# Patient Record
Sex: MALE | Race: WHITE | NOT HISPANIC OR LATINO | Employment: OTHER | URBAN - METROPOLITAN AREA
[De-identification: names, ages, dates, MRNs, and addresses within clinical notes are randomized per-mention and may not be internally consistent; named-entity substitution may affect disease eponyms.]

---

## 2020-03-23 ENCOUNTER — HOSPITAL ENCOUNTER (INPATIENT)
Facility: MEDICAL CENTER | Age: 85
LOS: 3 days | DRG: 064 | End: 2020-03-26
Attending: EMERGENCY MEDICINE | Admitting: INTERNAL MEDICINE
Payer: MEDICARE

## 2020-03-23 ENCOUNTER — HOSPITAL ENCOUNTER (OUTPATIENT)
Dept: RADIOLOGY | Facility: MEDICAL CENTER | Age: 85
End: 2020-03-23
Payer: MEDICARE

## 2020-03-23 LAB
ANION GAP SERPL CALC-SCNC: 11 MMOL/L (ref 7–16)
APTT PPP: 32.9 SEC (ref 24.7–36)
BASOPHILS # BLD AUTO: 1 % (ref 0–1.8)
BASOPHILS # BLD: 0.07 K/UL (ref 0–0.12)
BUN SERPL-MCNC: 25 MG/DL (ref 8–22)
CALCIUM SERPL-MCNC: 8.8 MG/DL (ref 8.5–10.5)
CHLORIDE SERPL-SCNC: 105 MMOL/L (ref 96–112)
CO2 SERPL-SCNC: 23 MMOL/L (ref 20–33)
CREAT SERPL-MCNC: 0.98 MG/DL (ref 0.5–1.4)
EOSINOPHIL # BLD AUTO: 0.36 K/UL (ref 0–0.51)
EOSINOPHIL NFR BLD: 4.9 % (ref 0–6.9)
ERYTHROCYTE [DISTWIDTH] IN BLOOD BY AUTOMATED COUNT: 42.4 FL (ref 35.9–50)
GLUCOSE SERPL-MCNC: 104 MG/DL (ref 65–99)
HCT VFR BLD AUTO: 47.1 % (ref 42–52)
HGB BLD-MCNC: 15.6 G/DL (ref 14–18)
IMM GRANULOCYTES # BLD AUTO: 0.02 K/UL (ref 0–0.11)
IMM GRANULOCYTES NFR BLD AUTO: 0.3 % (ref 0–0.9)
INR PPP: 1.15 (ref 0.87–1.13)
LYMPHOCYTES # BLD AUTO: 1.96 K/UL (ref 1–4.8)
LYMPHOCYTES NFR BLD: 26.7 % (ref 22–41)
MCH RBC QN AUTO: 30.7 PG (ref 27–33)
MCHC RBC AUTO-ENTMCNC: 33.1 G/DL (ref 33.7–35.3)
MCV RBC AUTO: 92.7 FL (ref 81.4–97.8)
MONOCYTES # BLD AUTO: 0.66 K/UL (ref 0–0.85)
MONOCYTES NFR BLD AUTO: 9 % (ref 0–13.4)
NEUTROPHILS # BLD AUTO: 4.26 K/UL (ref 1.82–7.42)
NEUTROPHILS NFR BLD: 58.1 % (ref 44–72)
NRBC # BLD AUTO: 0 K/UL
NRBC BLD-RTO: 0 /100 WBC
PLATELET # BLD AUTO: 205 K/UL (ref 164–446)
PMV BLD AUTO: 10.4 FL (ref 9–12.9)
POTASSIUM SERPL-SCNC: 4.4 MMOL/L (ref 3.6–5.5)
PROTHROMBIN TIME: 15 SEC (ref 12–14.6)
RBC # BLD AUTO: 5.08 M/UL (ref 4.7–6.1)
SODIUM SERPL-SCNC: 139 MMOL/L (ref 135–145)
TROPONIN T SERPL-MCNC: 13 NG/L (ref 6–19)
WBC # BLD AUTO: 7.3 K/UL (ref 4.8–10.8)

## 2020-03-23 PROCEDURE — 770004 HCHG ROOM/CARE - ONCOLOGY PRIVATE *

## 2020-03-23 PROCEDURE — 700111 HCHG RX REV CODE 636 W/ 250 OVERRIDE (IP): Performed by: EMERGENCY MEDICINE

## 2020-03-23 PROCEDURE — 36415 COLL VENOUS BLD VENIPUNCTURE: CPT

## 2020-03-23 PROCEDURE — 80048 BASIC METABOLIC PNL TOTAL CA: CPT

## 2020-03-23 PROCEDURE — 99285 EMERGENCY DEPT VISIT HI MDM: CPT

## 2020-03-23 PROCEDURE — 85025 COMPLETE CBC W/AUTO DIFF WBC: CPT

## 2020-03-23 PROCEDURE — 96374 THER/PROPH/DIAG INJ IV PUSH: CPT

## 2020-03-23 PROCEDURE — 85610 PROTHROMBIN TIME: CPT

## 2020-03-23 PROCEDURE — 84484 ASSAY OF TROPONIN QUANT: CPT

## 2020-03-23 PROCEDURE — 85730 THROMBOPLASTIN TIME PARTIAL: CPT

## 2020-03-23 RX ORDER — AMOXICILLIN 250 MG
2 CAPSULE ORAL 2 TIMES DAILY PRN
Status: DISCONTINUED | OUTPATIENT
Start: 2020-03-23 | End: 2020-03-26 | Stop reason: HOSPADM

## 2020-03-23 RX ORDER — ENALAPRILAT 1.25 MG/ML
1.25 INJECTION INTRAVENOUS EVERY 6 HOURS PRN
Status: DISCONTINUED | OUTPATIENT
Start: 2020-03-23 | End: 2020-03-26 | Stop reason: HOSPADM

## 2020-03-23 RX ORDER — ONDANSETRON 4 MG/1
4 TABLET, ORALLY DISINTEGRATING ORAL EVERY 4 HOURS PRN
Status: DISCONTINUED | OUTPATIENT
Start: 2020-03-23 | End: 2020-03-26 | Stop reason: HOSPADM

## 2020-03-23 RX ORDER — DEXAMETHASONE SODIUM PHOSPHATE 10 MG/ML
8 INJECTION, SOLUTION INTRAMUSCULAR; INTRAVENOUS ONCE
Status: COMPLETED | OUTPATIENT
Start: 2020-03-23 | End: 2020-03-23

## 2020-03-23 RX ORDER — ONDANSETRON 2 MG/ML
4 INJECTION INTRAMUSCULAR; INTRAVENOUS EVERY 4 HOURS PRN
Status: DISCONTINUED | OUTPATIENT
Start: 2020-03-23 | End: 2020-03-26 | Stop reason: HOSPADM

## 2020-03-23 RX ORDER — ASPIRIN 81 MG/1
81 TABLET, CHEWABLE ORAL DAILY
Status: ON HOLD | COMMUNITY
End: 2020-03-26

## 2020-03-23 RX ORDER — BISACODYL 10 MG
10 SUPPOSITORY, RECTAL RECTAL
Status: DISCONTINUED | OUTPATIENT
Start: 2020-03-23 | End: 2020-03-26 | Stop reason: HOSPADM

## 2020-03-23 RX ORDER — POLYETHYLENE GLYCOL 3350 17 G/17G
1 POWDER, FOR SOLUTION ORAL
Status: DISCONTINUED | OUTPATIENT
Start: 2020-03-23 | End: 2020-03-26 | Stop reason: HOSPADM

## 2020-03-23 RX ORDER — ACETAMINOPHEN 325 MG/1
650 TABLET ORAL EVERY 6 HOURS PRN
Status: DISCONTINUED | OUTPATIENT
Start: 2020-03-23 | End: 2020-03-26 | Stop reason: HOSPADM

## 2020-03-23 RX ORDER — ASPIRIN 81 MG/1
81 TABLET, CHEWABLE ORAL DAILY
Status: DISCONTINUED | OUTPATIENT
Start: 2020-03-24 | End: 2020-03-24

## 2020-03-23 RX ORDER — DEXAMETHASONE SODIUM PHOSPHATE 4 MG/ML
8 INJECTION, SOLUTION INTRA-ARTICULAR; INTRALESIONAL; INTRAMUSCULAR; INTRAVENOUS; SOFT TISSUE EVERY 12 HOURS
Status: DISCONTINUED | OUTPATIENT
Start: 2020-03-24 | End: 2020-03-24

## 2020-03-23 RX ORDER — HEPARIN SODIUM 5000 [USP'U]/ML
5000 INJECTION, SOLUTION INTRAVENOUS; SUBCUTANEOUS EVERY 8 HOURS
Status: DISCONTINUED | OUTPATIENT
Start: 2020-03-24 | End: 2020-03-24

## 2020-03-23 RX ADMIN — DEXAMETHASONE SODIUM PHOSPHATE 8 MG: 10 INJECTION INTRAMUSCULAR; INTRAVENOUS at 23:58

## 2020-03-23 SDOH — HEALTH STABILITY: MENTAL HEALTH: HOW OFTEN DO YOU HAVE A DRINK CONTAINING ALCOHOL?: MONTHLY OR LESS

## 2020-03-24 ENCOUNTER — APPOINTMENT (OUTPATIENT)
Dept: CARDIOLOGY | Facility: MEDICAL CENTER | Age: 85
DRG: 064 | End: 2020-03-24
Attending: STUDENT IN AN ORGANIZED HEALTH CARE EDUCATION/TRAINING PROGRAM
Payer: MEDICARE

## 2020-03-24 ENCOUNTER — APPOINTMENT (OUTPATIENT)
Dept: RADIOLOGY | Facility: MEDICAL CENTER | Age: 85
DRG: 064 | End: 2020-03-24
Attending: STUDENT IN AN ORGANIZED HEALTH CARE EDUCATION/TRAINING PROGRAM
Payer: MEDICARE

## 2020-03-24 PROBLEM — G93.89 BRAIN MASS: Status: ACTIVE | Noted: 2020-03-24

## 2020-03-24 PROBLEM — I25.10 CORONARY ARTERY DISEASE: Status: ACTIVE | Noted: 2020-03-24

## 2020-03-24 PROBLEM — E78.5 HYPERLIPIDEMIA: Status: ACTIVE | Noted: 2020-03-24

## 2020-03-24 PROBLEM — I63.539 ACUTE ISCHEMIC CEREBROVASCULAR ACCIDENT (CVA) INVOLVING POSTERIOR CEREBRAL ARTERY TERRITORY (HCC): Status: ACTIVE | Noted: 2020-03-24

## 2020-03-24 LAB
ABO + RH BLD: NORMAL
ABO GROUP BLD: NORMAL
BLD GP AB SCN SERPL QL: NORMAL
LV EJECT FRACT  99904: 60
LV EJECT FRACT MOD 2C 99903: 61.16
LV EJECT FRACT MOD 4C 99902: 59.76
LV EJECT FRACT MOD BP 99901: 60.49
RH BLD: NORMAL

## 2020-03-24 PROCEDURE — A9576 INJ PROHANCE MULTIPACK: HCPCS | Performed by: STUDENT IN AN ORGANIZED HEALTH CARE EDUCATION/TRAINING PROGRAM

## 2020-03-24 PROCEDURE — 99223 1ST HOSP IP/OBS HIGH 75: CPT | Performed by: PSYCHIATRY & NEUROLOGY

## 2020-03-24 PROCEDURE — 36415 COLL VENOUS BLD VENIPUNCTURE: CPT

## 2020-03-24 PROCEDURE — 700102 HCHG RX REV CODE 250 W/ 637 OVERRIDE(OP): Performed by: INTERNAL MEDICINE

## 2020-03-24 PROCEDURE — 83036 HEMOGLOBIN GLYCOSYLATED A1C: CPT

## 2020-03-24 PROCEDURE — 70553 MRI BRAIN STEM W/O & W/DYE: CPT

## 2020-03-24 PROCEDURE — A9270 NON-COVERED ITEM OR SERVICE: HCPCS | Performed by: INTERNAL MEDICINE

## 2020-03-24 PROCEDURE — 86900 BLOOD TYPING SEROLOGIC ABO: CPT

## 2020-03-24 PROCEDURE — 93880 EXTRACRANIAL BILAT STUDY: CPT

## 2020-03-24 PROCEDURE — 86901 BLOOD TYPING SEROLOGIC RH(D): CPT

## 2020-03-24 PROCEDURE — 700111 HCHG RX REV CODE 636 W/ 250 OVERRIDE (IP): Performed by: STUDENT IN AN ORGANIZED HEALTH CARE EDUCATION/TRAINING PROGRAM

## 2020-03-24 PROCEDURE — 700117 HCHG RX CONTRAST REV CODE 255: Performed by: STUDENT IN AN ORGANIZED HEALTH CARE EDUCATION/TRAINING PROGRAM

## 2020-03-24 PROCEDURE — 93306 TTE W/DOPPLER COMPLETE: CPT

## 2020-03-24 PROCEDURE — 770020 HCHG ROOM/CARE - TELE (206)

## 2020-03-24 PROCEDURE — 93306 TTE W/DOPPLER COMPLETE: CPT | Mod: 26 | Performed by: INTERNAL MEDICINE

## 2020-03-24 PROCEDURE — 99223 1ST HOSP IP/OBS HIGH 75: CPT | Mod: AI,GC | Performed by: INTERNAL MEDICINE

## 2020-03-24 PROCEDURE — 86850 RBC ANTIBODY SCREEN: CPT

## 2020-03-24 RX ORDER — ATORVASTATIN CALCIUM 80 MG/1
80 TABLET, FILM COATED ORAL EVERY EVENING
Status: DISCONTINUED | OUTPATIENT
Start: 2020-03-24 | End: 2020-03-26 | Stop reason: HOSPADM

## 2020-03-24 RX ORDER — SODIUM PHOSPHATE,MONO-DIBASIC 19G-7G/118
500 ENEMA (ML) RECTAL DAILY
COMMUNITY

## 2020-03-24 RX ORDER — ATORVASTATIN CALCIUM 20 MG/1
20 TABLET, FILM COATED ORAL NIGHTLY
Status: ON HOLD | COMMUNITY
End: 2020-03-26

## 2020-03-24 RX ORDER — ASPIRIN 81 MG/1
81 TABLET, CHEWABLE ORAL DAILY
Status: DISCONTINUED | OUTPATIENT
Start: 2020-03-30 | End: 2020-03-26 | Stop reason: HOSPADM

## 2020-03-24 RX ADMIN — ATORVASTATIN CALCIUM 80 MG: 80 TABLET, FILM COATED ORAL at 17:57

## 2020-03-24 RX ADMIN — HEPARIN SODIUM 5000 UNITS: 5000 INJECTION, SOLUTION INTRAVENOUS; SUBCUTANEOUS at 05:44

## 2020-03-24 RX ADMIN — HEPARIN SODIUM 5000 UNITS: 5000 INJECTION, SOLUTION INTRAVENOUS; SUBCUTANEOUS at 00:50

## 2020-03-24 RX ADMIN — GADOTERIDOL 15 ML: 279.3 INJECTION, SOLUTION INTRAVENOUS at 08:17

## 2020-03-24 RX ADMIN — DEXAMETHASONE SODIUM PHOSPHATE 8 MG: 4 INJECTION, SOLUTION INTRA-ARTICULAR; INTRALESIONAL; INTRAMUSCULAR; INTRAVENOUS; SOFT TISSUE at 05:44

## 2020-03-24 ASSESSMENT — ENCOUNTER SYMPTOMS
BLOOD IN STOOL: 0
HEADACHES: 0
DIZZINESS: 0
COUGH: 0
FOCAL WEAKNESS: 0
WEIGHT LOSS: 0
MUSCULOSKELETAL NEGATIVE: 1
BRUISES/BLEEDS EASILY: 0
ABDOMINAL PAIN: 0
DOUBLE VISION: 0
NAUSEA: 0
TREMORS: 0
EYE PAIN: 0
HEMOPTYSIS: 0
BLURRED VISION: 1
HEADACHES: 1
DEPRESSION: 0
EYE REDNESS: 0
NERVOUS/ANXIOUS: 0
NECK PAIN: 0
RESPIRATORY NEGATIVE: 1
FEVER: 0
PALPITATIONS: 0
PHOTOPHOBIA: 0
SPEECH CHANGE: 0
SHORTNESS OF BREATH: 0
CONSTIPATION: 0
CONSTITUTIONAL NEGATIVE: 1
MYALGIAS: 0
SORE THROAT: 0
SENSORY CHANGE: 0
POLYDIPSIA: 0
LOSS OF CONSCIOUSNESS: 0
CARDIOVASCULAR NEGATIVE: 1
GASTROINTESTINAL NEGATIVE: 1
WEAKNESS: 0
VOMITING: 0
DIARRHEA: 0
TINGLING: 0
SEIZURES: 0
EYE DISCHARGE: 0
PSYCHIATRIC NEGATIVE: 1

## 2020-03-24 ASSESSMENT — LIFESTYLE VARIABLES
EVER FELT BAD OR GUILTY ABOUT YOUR DRINKING: NO
EVER_SMOKED: NEVER
ALCOHOL_USE: YES
HAVE PEOPLE ANNOYED YOU BY CRITICIZING YOUR DRINKING: NO
ON A TYPICAL DAY WHEN YOU DRINK ALCOHOL HOW MANY DRINKS DO YOU HAVE: 1
EVER HAD A DRINK FIRST THING IN THE MORNING TO STEADY YOUR NERVES TO GET RID OF A HANGOVER: NO
DOES PATIENT WANT TO STOP DRINKING: NO
HOW MANY TIMES IN THE PAST YEAR HAVE YOU HAD 5 OR MORE DRINKS IN A DAY: 0
CONSUMPTION TOTAL: NEGATIVE
SUBSTANCE_ABUSE: 0
HAVE YOU EVER FELT YOU SHOULD CUT DOWN ON YOUR DRINKING: NO
AVERAGE NUMBER OF DAYS PER WEEK YOU HAVE A DRINK CONTAINING ALCOHOL: 1
TOTAL SCORE: 0

## 2020-03-24 ASSESSMENT — PATIENT HEALTH QUESTIONNAIRE - PHQ9
1. LITTLE INTEREST OR PLEASURE IN DOING THINGS: NOT AT ALL
SUM OF ALL RESPONSES TO PHQ9 QUESTIONS 1 AND 2: 0
2. FEELING DOWN, DEPRESSED, IRRITABLE, OR HOPELESS: NOT AT ALL

## 2020-03-24 NOTE — PROGRESS NOTES
Daily Progress Note:     Date of Service: 3/24/2020  Primary Team: [facility] IM White Team   Attending: Maico Morse M.D.   Senior Resident: Dr. Gage  Intern: Dr. Daigle  Contact:  [phone]    Chief Complaint:   Right  peripheral vision loss    Subjective:  -PMHx of CAD s/p stents x2, HLD, macular degeneration of left eye, is a transfer patient from [facility] on 3/23/2020 with  chief complaint of right-sided peripheral vision loss noted while watching the TV.  Patient unable to see the right side of TV and would turn his head and able to see it.  -CT scan of head done at outside facility which showed left occipital mass, MRI brain with and without contrast was done and [facility] 3/24 revealed ischemic stroke with hemorrhagic transformation of left occipital lobe.  -Patient reported no neurological deficits except colorful lines in his visual deficits.   -During bedside evaluation patient states his right-sided vision deficit improved and he is feeling better.  Reported no numbness weakness, aphasia, ataxia, double vision, headache.  -Neurology was consulted and appreciate the recommendation Dr. Camargo made.   -Late afternoon nurse called and states patient required neuro exam every 4 hour so we decided to transfer the patient to neurology floor.  Patient reported that he is having trouble with his peripheral vision on right side     Consultants/Specialty:  Neurology     Review of Systems:    Review of Systems   Constitutional: Negative.    HENT: Negative.    Eyes: Positive for blurred vision (right peripheral vision). Negative for double vision, photophobia, pain, discharge and redness.   Respiratory: Negative.    Cardiovascular: Negative.    Gastrointestinal: Negative.    Musculoskeletal: Negative.    Skin: Negative.    Neurological: Negative for dizziness, tingling, tremors, sensory change, speech change, focal weakness, seizures, loss of consciousness, weakness and headaches.   Psychiatric/Behavioral:  Negative.        Objective Data:   Physical Exam:   Vitals:   Temp:  [36.4 °C (97.6 °F)-37.2 °C (99 °F)] 36.6 °C (97.9 °F)  Pulse:  [56-87] 87  Resp:  [14-18] 18  BP: (120-198)/(66-99) 120/84  SpO2:  [92 %-96 %] 93 %      Physical Exam  Constitutional:       General: He is not in acute distress.     Appearance: Normal appearance. He is not ill-appearing.   HENT:      Head: Normocephalic and atraumatic.      Mouth/Throat:      Mouth: Mucous membranes are moist.   Eyes:      Extraocular Movements: Extraocular movements intact.      Conjunctiva/sclera: Conjunctivae normal.      Pupils: Pupils are equal, round, and reactive to light.   Neck:      Musculoskeletal: Normal range of motion. No neck rigidity or muscular tenderness.   Cardiovascular:      Rate and Rhythm: Normal rate and regular rhythm.      Pulses: Normal pulses.   Pulmonary:      Effort: Pulmonary effort is normal.      Breath sounds: Normal breath sounds.   Abdominal:      General: Abdomen is flat. Bowel sounds are normal.      Palpations: Abdomen is soft.   Musculoskeletal: Normal range of motion.   Skin:     General: Skin is warm.   Neurological:      General: No focal deficit present.      Mental Status: He is alert and oriented to person, place, and time. Mental status is at baseline.      Cranial Nerves: No cranial nerve deficit.      Sensory: No sensory deficit.      Motor: No weakness.      Coordination: Coordination normal.      Gait: Gait normal.      Deep Tendon Reflexes: Reflexes normal.   Psychiatric:         Mood and Affect: Mood normal.         Behavior: Behavior normal.           Labs:   Lab Results   Component Value Date/Time    SODIUM 139 03/23/2020 10:41 PM    POTASSIUM 4.4 03/23/2020 10:41 PM    CHLORIDE 105 03/23/2020 10:41 PM    CO2 23 03/23/2020 10:41 PM    GLUCOSE 104 (H) 03/23/2020 10:41 PM    BUN 25 (H) 03/23/2020 10:41 PM    CREATININE 0.98 03/23/2020 10:41 PM      Lab Results   Component Value Date/Time    WBC 7.3 03/23/2020  10:41 PM    RBC 5.08 03/23/2020 10:41 PM    HEMOGLOBIN 15.6 03/23/2020 10:41 PM    HEMATOCRIT 47.1 03/23/2020 10:41 PM    MCV 92.7 03/23/2020 10:41 PM    MCH 30.7 03/23/2020 10:41 PM    MCHC 33.1 (L) 03/23/2020 10:41 PM    MPV 10.4 03/23/2020 10:41 PM    NEUTSPOLYS 58.10 03/23/2020 10:41 PM    LYMPHOCYTES 26.70 03/23/2020 10:41 PM    MONOCYTES 9.00 03/23/2020 10:41 PM    EOSINOPHILS 4.90 03/23/2020 10:41 PM    BASOPHILS 1.00 03/23/2020 10:41 PM       Lab Results   Component Value Date/Time    PROTHROMBTM 15.0 (H) 03/23/2020 10:41 PM    INR 1.15 (H) 03/23/2020 10:41 PM       Imaging:   3/23/2020-after reviewing CT head done and outside facility thought to be tumor in the left occipital.   3/24/2020 -MRI brain with and without contrast revealed acute versus subacute ischemic stroke with hemorrhagic transformation in left occipital lobe.    * Acute ischemic cerebrovascular accident (CVA) involving posterior cerebral artery territory (HCC)- (present on admission)  Assessment & Plan  Patient is a 84-year-old male who presented with right vision changes, occipital headache and MRI head revealed ischemic stroke on left occipital lobe.  -Most common cause of occipital stroke is obstruction of the posterior cerebral artery and less likely due to hemorrhage of PCA.  -Major risk factors are diabetes and hypertension. Remote history of skin cancer s/p excised 15 years ago & history of CAD.  -Currently not on any anticoagulation.    Plan:  -Neuro check every 4 hours and stroke assessment with an eye patch as a scale every 12 hour for 3 days and day of discharge.  -Start high intensity statin  -Order CTA head to check the posterior circulation.  -Echo cardio graffiti with bubble study to rule out embolic phenomena  -Keep blood pressure under control goal less than 130/80.  PRN antihypertensive regimen ordered.  -We will initiate smoking cessation and stroke education.  -No antiplatelet therapy for the next 7 days, okay to start  aspirin 81 mg on 3/30   -For now continue SCDs  -Follow lipid panel and A1c.  -Fall/seizure/aspiration precautions  -PT/OT consult placed  -Appreciated neurology recommendations  -We will transfer the patient to neurology floor as he was previously admitted to oncology floor based on of outside CT results.    Coronary artery disease  Assessment & Plan  -History of CAD s/p stent x 2  -No aspirin or other antiplatelet agents for next 7 days.  -Continue to monitor on telemetry  -Blood pressure is well controlled without medications, not on antihypertensive regimen at home either.  -Restart aspirin 81 mg on 3/30    Hyperlipidemia  Assessment & Plan  -On Lipitor at home, unable to recall the exact dose.  -Started on high intensity statin.  -Lipid panel still pending.

## 2020-03-24 NOTE — PROGRESS NOTES
Bedside report given to Neuroscience RN's. Pt transferred via gurney with Neuroscience RN's to with all belongings.

## 2020-03-24 NOTE — PROGRESS NOTES
Received report from night shift RN. POC discussed with patient, pt verbalizes understanding and in agreement. A&Ox4. Q4 Neuro checks.PIV in left and right arm patent, no blood return, SL. SBA. Hard of hearing; wife will be bringing hearing aids and razor for pt. No complaints of pain at this time. Pt up to chair, chair alarm on, call light in reach, hourly rounding in place.

## 2020-03-24 NOTE — SENIOR ADMIT NOTE
84 year old male presents from outside facility for right peripheral vision deficit and normal exam by Ophthalmology, with CT head showing 1.7 cm mass that is suspicious for malignancy/metastatic disease per Radiologist. Never smoker. Had skin cancer of unknown type removed from face without recurrence. Steroids started by ED physician. MRI brain w/wo ordered. Further workup and consults tomorrow per primary team.

## 2020-03-24 NOTE — ASSESSMENT & PLAN NOTE
-History of CAD s/p stent x 2  -No aspirin or other antiplatelet agents for next 7 days.  -Continue to monitor on telemetry  -Blood pressure is well controlled without medications, not on antihypertensive regimen at home either.  -Will restart aspirin 81 mg on 3/30

## 2020-03-24 NOTE — CARE PLAN
Problem: Safety  Goal: Will remain free from injury  Outcome: PROGRESSING AS EXPECTED  Note: Pt up to chair. Chair alarm in place. Pt educated on the importance of utilizing call light to call for assistance.      Problem: Knowledge Deficit  Goal: Knowledge of disease process/condition, treatment plan, diagnostic tests, and medications will improve  Outcome: PROGRESSING AS EXPECTED  Note: MRI screening done by night shift RN. Pt down to MRI, awaiting results and further consults. Will continue to update pt on POC.

## 2020-03-24 NOTE — ED TRIAGE NOTES
Chief Complaint   Patient presents with   • Eye Problem     Pt BIB [facility] as transfer from [facility].  Pt began to have right eye vision changes with half vision turning black then intermittent spots.  Pt went to opthamolgy and found to have no eye problems.  Pt went to er and found to have 1.7 cm mass on left occipital lobe and transferred to this ed for follow up.  Pt states no pain.

## 2020-03-24 NOTE — CONSULTS
"Hospital Neurology Consult:    Referring Physician: Maico Morse M.D.    Reason for consultation: Stroke    HPI: Isaias Singh is a 84 y.o. male with history of macular degeneration, MI, rectal bleeding presenting to the hospital for visual loss and consulted for stroke. Pt was watching TV when he noted he was unable to see the R side of the TV as well. He would turn his head and he would be able to see the TV then. On primary gaze he did not notice any deficit when he was looking at a single object. He went to [facility] and a CT was performed which showed a L occipital hemorrhage concerning for malignancy and he was transferred here for further evaluation. MR Brain W/O CST was performed showing ischemic stroke in this region w/ hemorrhagic transformation.  The patient endorses seeing photopsias and colorful objects in his visual deficit.  He states that he finds them \"quite beautiful\".  These have been in decline for the past 24 hours.  He otherwise denies any numbness, weakness, speech difficulty, vertigo, or double vision.    ROS:     As above. All other systems reviewed and are negative.    Past Medical History:    has a past medical history of Cancer ([facility]), Cataract, Cold, Macular degeneration disease, Myocardial infarct ([facility]), Unspecified urinary incontinence, and Urinary bladder disorder. He also has no past medical history of Anginal syndrome ([facility]), Arrhythmia, Arthritis, Asthma, At risk for sleep apnea, Back pain, Blood clotting disorder ([facility]), Bronchitis, Congestive heart failure ([facility]), COPD (chronic obstructive pulmonary disease) ([facility]), Diabetes ([facility]), Dialysis patient ([facility]), Disorder of thyroid, Fall, Glaucoma, Gynecological disorder, Heart murmur, Heart valve disease, Hemorrhagic disorder ([facility]), Hypertension, Indigestion, Infectious disease, Jaundice, Pacemaker, Pneumonia, Psychiatric problem, Renal disorder, Rheumatic fever, Seizure ([facility]), or Stroke ([facility]).    FHx:  Stroke in his " father    SHrhea:   reports that he has never smoked. He has never used smokeless tobacco. He reports current alcohol use. He reports that he does not use drugs.    Allergies:  Allergies   Allergen Reactions   • Penicillins Rash       Medications:    Current Facility-Administered Medications:   •  atorvastatin (LIPITOR) tablet 80 mg, 80 mg, Oral, Q EVENING, Maico Morse M.D.  •  senna-docusate (PERICOLACE or SENOKOT S) 8.6-50 MG per tablet 2 Tab, 2 Tab, Oral, BID PRN **AND** polyethylene glycol/lytes (MIRALAX) PACKET 1 Packet, 1 Packet, Oral, QDAY PRN **AND** magnesium hydroxide (MILK OF MAGNESIA) suspension 30 mL, 30 mL, Oral, QDAY PRN **AND** bisacodyl (DULCOLAX) suppository 10 mg, 10 mg, Rectal, QDAY PRN, Kelvin Frost M.D.  •  acetaminophen (TYLENOL) tablet 650 mg, 650 mg, Oral, Q6HRS PRN, Kelvin Frost M.D.  •  ondansetron (ZOFRAN) syringe/vial injection 4 mg, 4 mg, Intravenous, Q4HRS PRN, Kelvin Frost M.D.  •  ondansetron (ZOFRAN ODT) dispertab 4 mg, 4 mg, Oral, Q4HRS PRN, Kelvin Frost M.D.  •  enalaprilat (VASOTEC) injection 1.25 mg, 1.25 mg, Intravenous, Q6HRS PRN, Kelvin Frost M.D.    Vitals:   Vitals:    03/23/20 2320 03/24/20 0033 03/24/20 0413 03/24/20 0724   BP: 151/82 149/77 129/76 127/66   Pulse: (!) 58 (!) 56 71 71   Resp: 16 18 18 18   Temp:  36.8 °C (98.3 °F) 37.2 °C (99 °F) 36.4 °C (97.6 °F)   TempSrc:  Temporal Temporal Temporal   SpO2: 94% 96% 92% 92%   Weight:  68.3 kg (150 lb 9.2 oz)     Height:           Labs:  Lab Results   Component Value Date/Time    PROTHROMBTM 15.0 (H) 03/23/2020 10:41 PM    INR 1.15 (H) 03/23/2020 10:41 PM      Lab Results   Component Value Date/Time    WBC 7.3 03/23/2020 10:41 PM    RBC 5.08 03/23/2020 10:41 PM    HEMOGLOBIN 15.6 03/23/2020 10:41 PM    HEMATOCRIT 47.1 03/23/2020 10:41 PM    MCV 92.7 03/23/2020 10:41 PM    MCH 30.7 03/23/2020 10:41 PM    MCHC 33.1 (L) 03/23/2020 10:41 PM    MPV 10.4 03/23/2020 10:41 PM    NEUTSPOLYS 58.10 03/23/2020 10:41 PM     LYMPHOCYTES 26.70 03/23/2020 10:41 PM    MONOCYTES 9.00 03/23/2020 10:41 PM    EOSINOPHILS 4.90 03/23/2020 10:41 PM    BASOPHILS 1.00 03/23/2020 10:41 PM      Lab Results   Component Value Date/Time    SODIUM 139 03/23/2020 10:41 PM    POTASSIUM 4.4 03/23/2020 10:41 PM    CHLORIDE 105 03/23/2020 10:41 PM    CO2 23 03/23/2020 10:41 PM    GLUCOSE 104 (H) 03/23/2020 10:41 PM    BUN 25 (H) 03/23/2020 10:41 PM    CREATININE 0.98 03/23/2020 10:41 PM          Lab Results   Component Value Date/Time    ALKPHOSPHAT 55 04/08/2013 10:48 AM    ASTSGOT 23 04/08/2013 10:48 AM    ALTSGPT 24 04/08/2013 10:48 AM    TBILIRUBIN 0.8 04/08/2013 10:48 AM      Imaging/Testing:  MRI brain without contrast on 3/24/2020 was personally reviewed with presence of an acute/subacute infarct in the left occipital lobe with hemorrhagic transformation.    Carotid Doppler reviewed in chart.    Physical Exam:     General: 84-year-old male sitting in bed no acute distress.  Cardio: Normal S1/S2. No peripheral edema.   Pulm: CTAX2. No respiratory distress.   Skin: Warm, dry, no rashes or lesions   Psychiatric: Appropriate affect. No active psychosis.  HEENT: Atraumatic head, normal sclera and conjunctiva, moist oral mucosa. No lid lag.  Abdomen: Soft, non tender. No masses or hepatosplenomegaly.    Neurologic:  Mental Status:  AAOx4. Able to follow commands/cross midline. Speech fluent/nondysarthric. Language functions intact. No neglect/apraxia.  Cranial Nerves:  PERRL. EOMi. Face symmetric, palate/tongue midline.  Presence of right homonymous hemianopia.  Facial sensation intact.   Motor:  Normal muscle tone and bulk. Strength is 5/5 throughout. No abnormal movements.  Reflexes: Deferred  Coordination: Finger-to-nose without ataxia  Sensation: Normal to light touch throughout  Gait/Station: Deferred    Assessment/Plan:    Isaias Singh is a 84 y.o. male with history of macular degeneration, MI, rectal bleeding presenting to the hospital  for visual loss and consulted for stroke. MRI shows evidence of a L occipital infarct w/ hemorrhagic transformation. Stroke likely 2/2 cardioembolic vs artery-artery embolization. Presence of R hemianopsia on exam. Photopsias and visual phenomenon likely 2/2 release phenomena. This should resolve with time.     Plan:   -Neuro checks/vital signs per protocol.  -Aim for normotension  -LDL and Hgb A1C pending  -Obtain echocardiogram w/ bubble study  -Will likely need CTA Head to evaluate posterior circulation.  -Hold antiplatelets for now. Okay to start ASA 81mg daily in 7 days from admission (3/30/20)  -Initiate smoking cessation/stroke education.  -Schedule evaluation with PT/OT/ST  -Plan discussed with consulting physician and patient's nurse.       Raheem Camargo M.D., Diplomat of the American Board of Psychiatry and Neurology  Diplomat of [employer] Epilepsy Subspecialty   Assistant Clinical Professor, [facility] Neurology Consultant

## 2020-03-24 NOTE — H&P
History & Physical Note    Date of Admission: 3/23/2020  Admission Status: Inpatient  [facility] Team: [facility] IM White Team  Attending: Dr. Morse   Senior Resident: Dr. Gage  Intern: Dr. Daigle  Contact Number: [phone]    Chief Complaint: Eye problem    History of Present Illness (HPI):   Isaias is a 84 y.o. male with the past medical history of coronary artery disease s/p stents x2, BPH, HLD, diverticulitis, macular degeneration of left eye, hearing loss, who was brought in by care flight ground as transfer from [facility] on 3/23/2020 with eye problem.  Patient reported that he is having trouble with his peripheral vision on right side since 2 days.  It started yesterday while he was watching TV, he noticed that he is not having peripheral vision on the right side and his vision is black. No issues with left eye vision. It lasted all day with a brief period of normal vision.  Vision loss is associated with eye pain, which later progressed to headache in the occipital area.  Denies any eye redness, discharge or head injury.  Denies neck stiffness, dizziness, weight loss, seizures, weakness or other focal neurological deficits.  He has balance issues for the past 1 year, but able to ambulate without falling.  He has a remote history of cancer on his face, excised 15 years ago.  He went to the ophthalmologist yesterday, and found to have no eye issues.  He was evaluated in the ED today at [facility] and CT showed 1.7 cm mass on left occipital lobe.  He was transferred to [facility] for further management.  In the ED, he is afebrile, blood pressure 198/99, other vitals stable.  Labs for CBC and CMP are unremarkable.  CT head without contrast from outside hospital showed 1.7 cm mass on left occipital lobe.  He received a dose of IV Decadron 8 mg in the ED.  Patient will be admitted to oncology floor for further work-up of his brain mass.    Review of Systems:  Review of Systems   Constitutional:  Negative for fever and weight loss.   HENT: Positive for hearing loss. Negative for congestion, nosebleeds and sore throat.    Eyes: Positive for blurred vision. Negative for double vision, photophobia, pain, discharge and redness.        Loss of peripheral vision on right side   Respiratory: Negative for cough, hemoptysis and shortness of breath.    Cardiovascular: Negative for chest pain, palpitations and leg swelling.   Gastrointestinal: Negative for abdominal pain, blood in stool, constipation, diarrhea, melena, nausea and vomiting.   Genitourinary: Negative for dysuria, frequency and hematuria.   Musculoskeletal: Negative for joint pain, myalgias and neck pain.   Skin: Negative for itching and rash.   Neurological: Positive for headaches. Negative for tingling, tremors, sensory change, speech change, focal weakness, seizures and loss of consciousness.   Endo/Heme/Allergies: Negative for polydipsia. Does not bruise/bleed easily.   Psychiatric/Behavioral: Negative for depression and substance abuse. The patient is not nervous/anxious.      Past Medical History:   Past Medical History was reviewed with patient.  Macular degeneration of left eye  BPH  Coronary artery disease status post stents x2  Hyperlipidemia  Diverticulitis  Hearing loss    Past Surgical History: Past Surgical History was reviewed with patient.   has a past surgical history that includes other cardiac surgery and trans urethral resection prostate (2013).    Medications: Medications have been reviewed with patient.  Prior to Admission Medications   Prescriptions Last Dose Informant Patient Reported? Taking?   aspirin (ASA) 81 MG Chew Tab chewable tablet   Yes Yes   Sig: Take 81 mg by mouth every day.      Facility-Administered Medications: None     Allergies: Allergies have been reviewed with patient.  Allergies   Allergen Reactions   • Penicillins Rash     Family History: Father  of diverticulitis, brother had a colon resection for  diverticulitis.  Mother had stomach cancer because of radiation.  No other significant history of familial cancers.     Social History:   Tobacco: Never smoked  Alcohol: Rare use  Recreational drugs (illegal and prescription): Never used any illegal drugs or IVDU  Activity Level: Moderate  Living situation:  Lives with his wife in [place], [place]  Recent travel: From [place], [place]  Primary Care Provider: patrice Yoon M.D.  Other (stressors, spirituality, exposures):  NA  Physical Exam:   Vitals:  Temp:  [36.5 °C (97.7 °F)] 36.5 °C (97.7 °F)  Pulse:  [63-72] 63  Resp:  [14] 14  BP: (198)/(99) 198/99  SpO2:  [95 %] 95 %    Physical Exam  Constitutional:       General: He is not in acute distress.     Appearance: Normal appearance.   HENT:      Head: Normocephalic and atraumatic.      Nose: Nose normal.      Mouth/Throat:      Mouth: Mucous membranes are dry.   Eyes:      General: No scleral icterus.        Right eye: No discharge.         Left eye: No discharge.      Extraocular Movements: Extraocular movements intact.      Conjunctiva/sclera: Conjunctivae normal.      Pupils: Pupils are equal, round, and reactive to light.      Comments: Right peripheral vision loss on upper right quadrant+   Neck:      Musculoskeletal: Normal range of motion. No neck rigidity.   Cardiovascular:      Rate and Rhythm: Normal rate and regular rhythm.      Heart sounds: Normal heart sounds.   Pulmonary:      Effort: Pulmonary effort is normal. No respiratory distress.      Breath sounds: Normal breath sounds.   Abdominal:      General: Bowel sounds are normal. There is no distension.      Palpations: Abdomen is soft. There is no mass.      Tenderness: There is no abdominal tenderness.   Musculoskeletal: Normal range of motion.         General: No swelling or tenderness.   Lymphadenopathy:      Cervical: No cervical adenopathy.   Skin:     General: Skin is warm.      Capillary Refill: Capillary refill takes less than 2  seconds.      Findings: No rash.   Neurological:      Mental Status: He is alert and oriented to person, place, and time. Mental status is at baseline.      Cranial Nerves: Cranial nerve deficit present.      Sensory: No sensory deficit.      Motor: No weakness.      Coordination: Coordination normal.      Deep Tendon Reflexes: Reflexes normal.      Comments: Right superior quadrantanopia +   Psychiatric:         Mood and Affect: Mood normal.         Behavior: Behavior normal.         Thought Content: Thought content normal.         Judgment: Judgment normal.     Labs:   CBC: Unremarkable  BMP: BUN 25, creatinine 0.98, glucose 104  Troponin T 13    Imaging:   CT head without contrast:1.7 cm mass on left occipital lobe  MRI brain with and without contrast: Pending    Previous Data Review: reviewed    Assessment and plan:  * Brain mass- (present on admission)  Assessment & Plan  Patient is a 84-year-old male who presented with right vision changes, occipital headache and CT head which showed 1.7 cm mass on left occipital lobe  -No history of smoking, weight loss or any other symptoms  -Remote history of skin cancer s/p excised 15 years ago  -Pending MRI brain  -Received IV Decadron 8 mg in the ED    PLAN:  -Admit to oncology as inpatient  -On neurochecks every 4 hours  -Fall/seizure/aspiration precautions  -IV Decadron 8 mg twice daily  -Consider neurosurgery consult in the a.m.    Coronary artery disease  Assessment & Plan  -History of CAD s/p stent x 2  -Continue aspirin  -Not on any blood pressure medication at home    Hyperlipidemia  Assessment & Plan  -On Lipitor at home, patient unaware of the dose  -Day team to restart home dose

## 2020-03-24 NOTE — ASSESSMENT & PLAN NOTE
-On Lipitor at home, unable to recall the exact dose.  -Started on high intensity statin due to   -Lipid panel still pending.

## 2020-03-24 NOTE — PROGRESS NOTES
2 RN skin check complete with Aster RN  - Bilateral elbows red but blanching  - Lateral wart on L foot  Devices in place NA.  Skin assessed under devices NA.  Confirmed pressure ulcers found on NA.  New potential pressure ulcers noted on NA. Wound consult placed NA.  The following interventions in place:  - Waffle overlay  - Moisturizer  - Pillows in use for positioning

## 2020-03-24 NOTE — PROGRESS NOTES
Arrived to floor via gurney with transport. A&Ox4. Ambulated to restroom, very steady. Bed alarm, waffle, and seizure precautions in place. 2 RN skin check & admit profile complete. Wife to bring hearing aids and home medications sometime tomorrow.Orders in for MRI today. MRI screening tool completed. Q4hr Neuro checks. Denies pain, N/V, numbness or tingling. All needs met. Oriented to room and unit. Education provided on risk for falls and call light use provided. Bed locked & in low position.

## 2020-03-24 NOTE — ED PROVIDER NOTES
"ED Provider Note    Scribed for Castillo Benoit M.D. by Castillo Benoit M.D.. 3/23/2020,  10:52 PM.    CHIEF COMPLAINT  Chief Complaint   Patient presents with   • Eye Problem       [facility]  Isaias Singh is a 84 y.o. male who presents to the Emergency Department as a transfer from [facility].  He was seen there for right eye vision changes, described as a half field cut, then some intermittent \"spots.\"  He first went to ophthalmology, and was found to have no eye problems.  So, he was evaluated in the emergency department, and found to have a 1.7 cm mass on the left occipital lobe.  He was thus transferred here for further evaluation.  He says that the field cut was on the outside of his right eye, described as black shadows.  He had some dull headache last night, but not today.  He says that the visual changes are intermittent, and does not have them now.  He denies any head trauma.  He is not on blood thinners.  He denies fevers or chills, nausea or vomiting, chest pain or shortness of breath or unsteadiness or weakness or numbness or tingling.    I reviewed the patient's labs from prior to transfer.  His chemistry was generally unremarkable, with a slight BUN elevation at 110, and creatinine elevation at 1.1.  BUN to creatinine ratio was 25.5, indicating a prerenal cause of these changes.  He did have a troponin of 0.036, with an upper limit of normal of 0.034.  He has never had chest pain.  I suspect that this elevated troponin is from his intracranial process.  His CBC and differential was entirely unremarkable.  In addition to head CT he had an unremarkable CT of the orbits.         REVIEW OF SYSTEMS  See HPI for further details. All other systems are negative.     PAST MEDICAL HISTORY   has a past medical history of Cold, Macular degeneration disease, Unspecified urinary incontinence, and Urinary bladder disorder.    SOCIAL HISTORY  Social History     Tobacco Use   • Smoking status: " "Never Smoker   • Smokeless tobacco: Never Used   Substance and Sexual Activity   • Alcohol use: Yes     Frequency: Monthly or less   • Drug use: No   • Sexual activity: Not on file     Social History     Substance and Sexual Activity   Drug Use No       SURGICAL HISTORY   has a past surgical history that includes other cardiac surgery and trans urethral resection prostate (4/12/2013).    CURRENT MEDICATIONS  Home Medications     Reviewed by Frederic Parham R.N. (Registered Nurse) on 03/23/20 at 2244  Med List Status: Partial   Medication Last Dose Status   aspirin (ASA) 81 MG Chew Tab chewable tablet  Active                ALLERGIES  Allergies   Allergen Reactions   • Penicillins Rash       PHYSICAL EXAM  VITAL SIGNS: BP (!) 198/99   Pulse 63   Temp 36.5 °C (97.7 °F) (Temporal)   Resp 14   Ht 1.676 m (5' 6\")   Wt 68 kg (150 lb)   SpO2 95%   BMI 24.21 kg/m²   Pulse ox interpretation: I interpret this pulse ox as normal.  Constitutional: Alert in no apparent distress.  HENT: No signs of trauma, Bilateral external ears normal, Nose normal.   Eyes: Pupils are equal and reactive, Conjunctiva normal, Non-icteric.   Neck: Normal range of motion, Supple, No stridor.    Cardiovascular: Regular rate and rhythm.  No murmurs rubs or gallops.  Thorax & Lungs: Clear lungs.  No wheezing.  No increased work of breathing.  Abdomen: Non-distended  Skin:  No erythema, No rash.   Back: Normal alignment and ROM  Extremities: No gross deformity  Musculoskeletal: Good range of motion in all major joints.   Neurologic: Alert, Normal motor function, No focal deficits noted.   Psychiatric: Affect normal, Judgment normal, Mood normal.      DIAGNOSTIC STUDIES / PROCEDURES      LABS  Labs Reviewed - No data to display  All labs reviewed by me.    RADIOLOGY  CT-FOREIGN FILM CAT SCAN   Final Result      OUTSIDE IMAGES-CT HEAD   Final Result        The radiologist's interpretation of all radiological studies have been reviewed by " me.    COURSE & MEDICAL DECISION MAKING  Nursing notes, VS, PMSFHx reviewed in chart.     10:52 PM Patient seen and examined at bedside.  He has a known 1.7 cm left-sided occipital mass.  This will need to be further characterized with MRI, and neurosurgery will need to be consulted.  Patient may benefit from steroid treatment in the meantime.  I will page the resident service for admission.    11:19 PM [facility] agrees to admit.  I've ordered a first dose of decadron IV.     DISPOSITION:  Patient will be admitted to [facility] in stable condition.      FINAL IMPRESSION  1. Left sided occipital brain mass  2. Vision changes  3. Elevated troponin  4. Hypertension

## 2020-03-24 NOTE — ASSESSMENT & PLAN NOTE
Patient is a 84-year-old male who presented with right vision changes, occipital headache and MRI head revealed ischemic stroke on left occipital lobe.  -Most common cause of occipital stroke is obstruction of the posterior cerebral artery and less likely due to hemorrhage of PCA.  -Major risk factors are diabetes and hypertension. Remote history of skin cancer s/p excised 15 years ago & history of CAD.  -Currently not on any anticoagulation.    Plan:  -Neuro check every 4 hours and stroke assessment with an NIHSS every 12 hour for 3 days and day of discharge.  -Cont high intensity statin  -Pending CTA head to check the posterior circulation.  -Echo shows no wall motion abnormalities, with EF 60%  -Keep blood pressure under control goal less than 130/80.  PRN antihypertensive regimen ordered.  -Will initiate smoking cessation and stroke education.  -No antiplatelet therapy for total 1 week, start from 3/23, okay to start aspirin 81 mg on 3/30   -continue SCDs  -lipid panel WNL chol 108, Hdl 49, TG 83, LDL 42 and A1c 5.7  Continuous Fall/seizure/aspiration precautions  -PT/OT consult pending  -Appreciated neurology recommendations  -Continues to monitor on Neurology floor

## 2020-03-25 ENCOUNTER — APPOINTMENT (OUTPATIENT)
Dept: RADIOLOGY | Facility: MEDICAL CENTER | Age: 85
DRG: 064 | End: 2020-03-25
Attending: STUDENT IN AN ORGANIZED HEALTH CARE EDUCATION/TRAINING PROGRAM
Payer: MEDICARE

## 2020-03-25 DIAGNOSIS — I63.9 ACUTE ISCHEMIC STROKE (HCC): Primary | ICD-10-CM

## 2020-03-25 PROBLEM — N17.9 AKI (ACUTE KIDNEY INJURY) (HCC): Status: ACTIVE | Noted: 2020-03-25

## 2020-03-25 LAB
ALBUMIN SERPL BCP-MCNC: 3.8 G/DL (ref 3.2–4.9)
ALBUMIN/GLOB SERPL: 1.5 G/DL
ALP SERPL-CCNC: 67 U/L (ref 30–99)
ALT SERPL-CCNC: 18 U/L (ref 2–50)
ANION GAP SERPL CALC-SCNC: 11 MMOL/L (ref 7–16)
ANION GAP SERPL CALC-SCNC: 11 MMOL/L (ref 7–16)
ANION GAP SERPL CALC-SCNC: 13 MMOL/L (ref 7–16)
AST SERPL-CCNC: 17 U/L (ref 12–45)
BILIRUB SERPL-MCNC: 0.3 MG/DL (ref 0.1–1.5)
BUN SERPL-MCNC: 24 MG/DL (ref 8–22)
BUN SERPL-MCNC: 26 MG/DL (ref 8–22)
BUN SERPL-MCNC: 27 MG/DL (ref 8–22)
CALCIUM SERPL-MCNC: 8.9 MG/DL (ref 8.5–10.5)
CALCIUM SERPL-MCNC: 9 MG/DL (ref 8.5–10.5)
CALCIUM SERPL-MCNC: 9.3 MG/DL (ref 8.5–10.5)
CHLORIDE SERPL-SCNC: 102 MMOL/L (ref 96–112)
CHLORIDE SERPL-SCNC: 106 MMOL/L (ref 96–112)
CHLORIDE SERPL-SCNC: 106 MMOL/L (ref 96–112)
CHOLEST SERPL-MCNC: 108 MG/DL (ref 100–199)
CO2 SERPL-SCNC: 22 MMOL/L (ref 20–33)
CO2 SERPL-SCNC: 25 MMOL/L (ref 20–33)
CO2 SERPL-SCNC: 25 MMOL/L (ref 20–33)
CREAT SERPL-MCNC: 1.2 MG/DL (ref 0.5–1.4)
CREAT SERPL-MCNC: 1.31 MG/DL (ref 0.5–1.4)
CREAT SERPL-MCNC: 1.35 MG/DL (ref 0.5–1.4)
ERYTHROCYTE [DISTWIDTH] IN BLOOD BY AUTOMATED COUNT: 41.1 FL (ref 35.9–50)
EST. AVERAGE GLUCOSE BLD GHB EST-MCNC: 117 MG/DL
GLOBULIN SER CALC-MCNC: 2.6 G/DL (ref 1.9–3.5)
GLUCOSE SERPL-MCNC: 105 MG/DL (ref 65–99)
GLUCOSE SERPL-MCNC: 108 MG/DL (ref 65–99)
GLUCOSE SERPL-MCNC: 110 MG/DL (ref 65–99)
HBA1C MFR BLD: 5.7 % (ref 0–5.6)
HCT VFR BLD AUTO: 46.6 % (ref 42–52)
HDLC SERPL-MCNC: 49 MG/DL
HGB BLD-MCNC: 15.4 G/DL (ref 14–18)
LDLC SERPL CALC-MCNC: 42 MG/DL
MCH RBC QN AUTO: 30.1 PG (ref 27–33)
MCHC RBC AUTO-ENTMCNC: 33 G/DL (ref 33.7–35.3)
MCV RBC AUTO: 91.2 FL (ref 81.4–97.8)
PLATELET # BLD AUTO: 237 K/UL (ref 164–446)
PMV BLD AUTO: 10.4 FL (ref 9–12.9)
POTASSIUM SERPL-SCNC: 3.9 MMOL/L (ref 3.6–5.5)
POTASSIUM SERPL-SCNC: 4.2 MMOL/L (ref 3.6–5.5)
POTASSIUM SERPL-SCNC: 4.2 MMOL/L (ref 3.6–5.5)
PROT SERPL-MCNC: 6.4 G/DL (ref 6–8.2)
RBC # BLD AUTO: 5.11 M/UL (ref 4.7–6.1)
SODIUM SERPL-SCNC: 139 MMOL/L (ref 135–145)
SODIUM SERPL-SCNC: 140 MMOL/L (ref 135–145)
SODIUM SERPL-SCNC: 142 MMOL/L (ref 135–145)
TRIGL SERPL-MCNC: 83 MG/DL (ref 0–149)
WBC # BLD AUTO: 14.4 K/UL (ref 4.8–10.8)

## 2020-03-25 PROCEDURE — 36415 COLL VENOUS BLD VENIPUNCTURE: CPT

## 2020-03-25 PROCEDURE — 700102 HCHG RX REV CODE 250 W/ 637 OVERRIDE(OP): Performed by: INTERNAL MEDICINE

## 2020-03-25 PROCEDURE — 80061 LIPID PANEL: CPT

## 2020-03-25 PROCEDURE — 700105 HCHG RX REV CODE 258: Performed by: STUDENT IN AN ORGANIZED HEALTH CARE EDUCATION/TRAINING PROGRAM

## 2020-03-25 PROCEDURE — 99233 SBSQ HOSP IP/OBS HIGH 50: CPT | Mod: GC | Performed by: INTERNAL MEDICINE

## 2020-03-25 PROCEDURE — 700117 HCHG RX CONTRAST REV CODE 255: Performed by: STUDENT IN AN ORGANIZED HEALTH CARE EDUCATION/TRAINING PROGRAM

## 2020-03-25 PROCEDURE — 80053 COMPREHEN METABOLIC PANEL: CPT

## 2020-03-25 PROCEDURE — 97161 PT EVAL LOW COMPLEX 20 MIN: CPT

## 2020-03-25 PROCEDURE — A9270 NON-COVERED ITEM OR SERVICE: HCPCS | Performed by: INTERNAL MEDICINE

## 2020-03-25 PROCEDURE — 80048 BASIC METABOLIC PNL TOTAL CA: CPT

## 2020-03-25 PROCEDURE — 99232 SBSQ HOSP IP/OBS MODERATE 35: CPT | Performed by: PSYCHIATRY & NEUROLOGY

## 2020-03-25 PROCEDURE — 85027 COMPLETE CBC AUTOMATED: CPT

## 2020-03-25 PROCEDURE — 97165 OT EVAL LOW COMPLEX 30 MIN: CPT

## 2020-03-25 PROCEDURE — 770020 HCHG ROOM/CARE - TELE (206)

## 2020-03-25 PROCEDURE — 70496 CT ANGIOGRAPHY HEAD: CPT

## 2020-03-25 RX ORDER — SODIUM CHLORIDE 9 MG/ML
500 INJECTION, SOLUTION INTRAVENOUS ONCE
Status: COMPLETED | OUTPATIENT
Start: 2020-03-25 | End: 2020-03-25

## 2020-03-25 RX ORDER — SODIUM CHLORIDE, SODIUM LACTATE, POTASSIUM CHLORIDE, AND CALCIUM CHLORIDE .6; .31; .03; .02 G/100ML; G/100ML; G/100ML; G/100ML
500 INJECTION, SOLUTION INTRAVENOUS ONCE
Status: COMPLETED | OUTPATIENT
Start: 2020-03-25 | End: 2020-03-25

## 2020-03-25 RX ADMIN — ATORVASTATIN CALCIUM 80 MG: 80 TABLET, FILM COATED ORAL at 17:36

## 2020-03-25 RX ADMIN — SODIUM CHLORIDE 500 ML: 9 INJECTION, SOLUTION INTRAVENOUS at 10:06

## 2020-03-25 RX ADMIN — IOHEXOL 80 ML: 350 INJECTION, SOLUTION INTRAVENOUS at 09:51

## 2020-03-25 RX ADMIN — SODIUM CHLORIDE, POTASSIUM CHLORIDE, SODIUM LACTATE AND CALCIUM CHLORIDE 500 ML: 600; 310; 30; 20 INJECTION, SOLUTION INTRAVENOUS at 17:56

## 2020-03-25 ASSESSMENT — COGNITIVE AND FUNCTIONAL STATUS - GENERAL
MOBILITY SCORE: 24
DAILY ACTIVITIY SCORE: 24
SUGGESTED CMS G CODE MODIFIER DAILY ACTIVITY: CH
SUGGESTED CMS G CODE MODIFIER MOBILITY: CH

## 2020-03-25 ASSESSMENT — ENCOUNTER SYMPTOMS
EYE PAIN: 0
SENSORY CHANGE: 0
DIZZINESS: 0
EYE REDNESS: 0
EYE DISCHARGE: 0
WEAKNESS: 0
SPEECH CHANGE: 0
LOSS OF CONSCIOUSNESS: 0
HEADACHES: 0
MUSCULOSKELETAL NEGATIVE: 1
DEPRESSION: 0
PHOTOPHOBIA: 0
CONSTITUTIONAL NEGATIVE: 1
DOUBLE VISION: 0
RESPIRATORY NEGATIVE: 1
BLURRED VISION: 0
GASTROINTESTINAL NEGATIVE: 1
FEVER: 0
SEIZURES: 0
FOCAL WEAKNESS: 0
CARDIOVASCULAR NEGATIVE: 1
CHILLS: 0

## 2020-03-25 ASSESSMENT — ACTIVITIES OF DAILY LIVING (ADL): TOILETING: INDEPENDENT

## 2020-03-25 ASSESSMENT — GAIT ASSESSMENTS
GAIT LEVEL OF ASSIST: SUPERVISED
DISTANCE (FEET): 200

## 2020-03-25 NOTE — ASSESSMENT & PLAN NOTE
-Mild elevation in Cr 0.98 upon admission to 1.20 on 3/25, and GFR decrease to 58.  -patient have insufficient oral intake.   -CT head with contrast done and given 500 mL bolus of normal saline.  -Encouraged the patient to have good oral intake of fluids  -recheck the basic metabolic panel at 1500.Give another bolus of IV fluids if Cr still elevated

## 2020-03-25 NOTE — DISCHARGE PLANNING
Current documentation reveals a potential for acute inpatient rehabilitation, pending completed W/U & TX evals.  Please consider a PMR referral to assist with discharge planning.

## 2020-03-25 NOTE — PROGRESS NOTES
Daily Progress Note:     Date of Service: 3/25/2020  Primary Team: [facility] IM White Team   Attending: Maico Morse M.D.   Senior Resident: Dr. Gage  Intern: Dr. Daigle  Contact:  [phone]    Chief Complaint:   Right  peripheral vision loss    Subjective:   PMHx of CAD s/p stents x2, HLD, macular degeneration of left eye, is a transfer patient from [facility] on 3/23/2020 with  chief complaint of right-sided peripheral vision loss noted while watching the TV.  Patient unable to see the right side of TV and would turn his head and able to see it. MRI brain with and without contrast was done and [facility] 3/24 revealed ischemic stroke with hemorrhagic transformation of left occipital lobe. Patient reported no neurological deficits except colorful lines in his visual deficits. Reported no numbness weakness, aphasia, ataxia, double vision, headache.     3/25  -No acute events overnight, pt is hemodynamically stable.   -No additional neurological and visual deficits reported since admission.   -Pt is doing better and clinically improving.   -Mild elevation in Cr 0.98 upon admission to 1.20 on 3/25, patient have insufficient oral intake.   -CTA head done w/ contrast, given 500 ml NS bolus.   -Spoke to patient's wife Kathy and updated her regarding the current diagnose, treatment and plan.     Consultants/Specialty:  Neurology    Review of Systems:    Review of Systems   Constitutional: Negative.  Negative for chills and fever.   HENT: Negative.    Eyes: Negative for blurred vision, double vision, photophobia, pain, discharge and redness.   Respiratory: Negative.    Cardiovascular: Negative.    Gastrointestinal: Negative.    Musculoskeletal: Negative.    Skin: Negative.    Neurological: Negative for dizziness, sensory change, speech change, focal weakness, seizures, loss of consciousness, weakness and headaches.   Psychiatric/Behavioral: Negative for depression and suicidal ideas.       Objective Data:   Physical  Exam:   Vitals:   Temp:  [36.1 °C (97 °F)-37.3 °C (99.1 °F)] 36.4 °C (97.5 °F)  Pulse:  [64-87] 64  Resp:  [16-18] 16  BP: (120-152)/(64-84) 141/72  SpO2:  [92 %-94 %] 93 %   CREATE MACRO BE SURE THAT THIS IS PERTINENT TO YOUR PATIENT!  Physical Exam  Constitutional:       General: He is not in acute distress.     Appearance: Normal appearance. He is normal weight. He is not ill-appearing.   HENT:      Head: Normocephalic and atraumatic.      Mouth/Throat:      Mouth: Mucous membranes are moist.      Pharynx: Oropharynx is clear. No oropharyngeal exudate or posterior oropharyngeal erythema.   Eyes:      General:         Right eye: No discharge.         Left eye: No discharge.      Extraocular Movements: Extraocular movements intact.      Conjunctiva/sclera: Conjunctivae normal.      Pupils: Pupils are equal, round, and reactive to light.   Neck:      Musculoskeletal: Normal range of motion. No neck rigidity or muscular tenderness.      Vascular: No carotid bruit.   Cardiovascular:      Rate and Rhythm: Normal rate and regular rhythm.      Pulses: Normal pulses.      Heart sounds: Normal heart sounds.   Pulmonary:      Effort: Pulmonary effort is normal. No respiratory distress.      Breath sounds: Normal breath sounds. No wheezing or rales.   Abdominal:      General: Abdomen is flat.   Musculoskeletal: Normal range of motion.   Lymphadenopathy:      Cervical: No cervical adenopathy.   Neurological:      General: No focal deficit present.      Mental Status: He is alert and oriented to person, place, and time. Mental status is at baseline.      Cranial Nerves: No cranial nerve deficit.      Sensory: No sensory deficit.      Motor: No weakness.      Coordination: Coordination normal.      Gait: Gait normal.      Deep Tendon Reflexes: Reflexes normal.   Psychiatric:         Mood and Affect: Mood normal.           Labs:   Lab Results   Component Value Date/Time    SODIUM 139 03/25/2020 03:55 AM    POTASSIUM 4.2  03/25/2020 03:55 AM    CHLORIDE 106 03/25/2020 03:55 AM    CO2 22 03/25/2020 03:55 AM    GLUCOSE 108 (H) 03/25/2020 03:55 AM    BUN 26 (H) 03/25/2020 03:55 AM    CREATININE 1.20 03/25/2020 03:55 AM      Lab Results   Component Value Date/Time    WBC 14.4 (H) 03/25/2020 03:55 AM    RBC 5.11 03/25/2020 03:55 AM    HEMOGLOBIN 15.4 03/25/2020 03:55 AM    HEMATOCRIT 46.6 03/25/2020 03:55 AM    MCV 91.2 03/25/2020 03:55 AM    MCH 30.1 03/25/2020 03:55 AM    MCHC 33.0 (L) 03/25/2020 03:55 AM    MPV 10.4 03/25/2020 03:55 AM    NEUTSPOLYS 58.10 03/23/2020 10:41 PM    LYMPHOCYTES 26.70 03/23/2020 10:41 PM    MONOCYTES 9.00 03/23/2020 10:41 PM    EOSINOPHILS 4.90 03/23/2020 10:41 PM    BASOPHILS 1.00 03/23/2020 10:41 PM      Imaging:   3/24/2020 -MRI brain with and without contrast revealed acute versus subacute ischemic stroke with hemorrhagic transformation in left occipital lobe.       * Acute ischemic cerebrovascular accident (CVA) involving posterior cerebral artery territory (HCC)- (present on admission)  Assessment & Plan  Patient is a 84-year-old male who presented with right vision changes, occipital headache and MRI head revealed ischemic stroke on left occipital lobe.  -Most common cause of occipital stroke is obstruction of the posterior cerebral artery and less likely due to hemorrhage of PCA.  -Major risk factors are diabetes and hypertension. Remote history of skin cancer s/p excised 15 years ago & history of CAD.  -Currently not on any anticoagulation.    Plan:  -Neuro check every 4 hours and stroke assessment with an NIHSS every 12 hour for 3 days and day of discharge.  -Cont high intensity statin  -Pending CTA head to check the posterior circulation.  -Echo shows no wall motion abnormalities, with EF 60%  -Keep blood pressure under control goal less than 130/80.  PRN antihypertensive regimen ordered.  -Will initiate smoking cessation and stroke education.  -No antiplatelet therapy for total 1 week, start from  3/23, okay to start aspirin 81 mg on 3/30   -continue SCDs  -lipid panel WNL chol 108, Hdl 49, TG 83, LDL 42 and A1c 5.7  Continuous Fall/seizure/aspiration precautions  -PT/OT consult pending  -Appreciated neurology recommendations  -Continues to monitor on Neurology floor    Coronary artery disease  Assessment & Plan  -History of CAD s/p stent x 2  -No aspirin or other antiplatelet agents for next 7 days.  -Continue to monitor on telemetry  -Blood pressure is well controlled without medications, not on antihypertensive regimen at home either.  -Will restart aspirin 81 mg on 3/30    Acute kidney injury likely due to dehydration   Assessment & Plan  -Mild elevation in Cr 0.98 upon admission to 1.20 on 3/25, and GFR decrease to 58.  -patient have insufficient oral intake.   -CT head with contrast done and given 500 mL bolus of normal saline.  -Encouraged the patient to have good oral intake of fluids  -recheck the basic metabolic panel at 1500.Give another bolus of IV fluids if Cr still elevated     Hyperlipidemia  Assessment & Plan  -On Lipitor at home, unable to recall the exact dose.  -Started on high intensity statin due to   -Lipid panel still pending.

## 2020-03-25 NOTE — PROGRESS NOTES
Pt arrived to unit, states he has dull headache, does not want anything at this time. [facility] performed with charge RN.

## 2020-03-25 NOTE — THERAPY
"Physical Therapy Evaluation completed.     Bed Mobility:  Supine to Sit: Supervised  Transfers: Sit to Stand: Supervised  Gait: Level Of Assist: Supervised with No Equipment Needed       Plan of Care: Patient with no further skilled PT needs in the acute care setting at this time  Discharge Recommendations: Equipment: No Equipment Needed. Post-acute therapy Discharge to home with outpatient or home health for additional skilled therapy services.    Patient is 84 year old male who presents with R peripheral vision deficit due to potential mass over left occipital lobe (posterior cerebral artery).  PMHx includes coronary artery disease s/p stents x2, BPH, HLD, diverticulitis, macular degeneration of left eye, hearing loss, cancer.  Patient demonstrates mild deficits in balance and strength, however these deficits will not impede patient's safety in returning home.  Recommend discharge home without an additional physical therapy services. Patient will not be actively followed for physical therapy services at this time, however may be seen if requested by physician for 1 more visit within 30 days to address any discharge or equipment needs.    Parker Hankins PT    See \"Rehab Therapy-Acute\" Patient Summary Report for complete documentation.     "

## 2020-03-25 NOTE — CARE PLAN
Problem: Knowledge Deficit  Goal: Knowledge of disease process/condition, treatment plan, diagnostic tests, and medications will improve  Outcome: PROGRESSING AS EXPECTED  Intervention: Explain information regarding disease process/condition, treatment plan, diagnostic tests, and medications and document in education  Note: Stroke education packet given and went over in detail with pt. Pt specific risk factors discusses, s/s, and what pt can to do prevent another stroke discussed.      Problem: Mobility:  Goal: Capacity to carry out activities will improve  Outcome: PROGRESSING AS EXPECTED  Intervention: Implement activity per PT/OT recommendations  Note: PT/OT ordered. Pt up in hallway with PT. Awaiting OT eval.

## 2020-03-25 NOTE — PROGRESS NOTES
[facility] Neurology Progress Note:     Interval History: No acute events overnight.  Complaints today.    Objective:   Vitals:    03/24/20 1700 03/24/20 2000 03/25/20 0000 03/25/20 0400   BP: 152/84 131/77 125/64 141/72   Pulse: 75 67 76 64   Resp: 18 18 18 16   Temp: 36.7 °C (98.1 °F) 36.1 °C (97 °F) 37.3 °C (99.1 °F) 36.4 °C (97.5 °F)   TempSrc: Temporal Temporal Temporal Temporal   SpO2: 92% 93% 94% 93%   Weight:       Height:           Labs:     Lab Results   Component Value Date/Time    PROTHROMBTM 15.0 (H) 03/23/2020 10:41 PM    INR 1.15 (H) 03/23/2020 10:41 PM      Lab Results   Component Value Date/Time    WBC 14.4 (H) 03/25/2020 03:55 AM    RBC 5.11 03/25/2020 03:55 AM    HEMOGLOBIN 15.4 03/25/2020 03:55 AM    HEMATOCRIT 46.6 03/25/2020 03:55 AM    MCV 91.2 03/25/2020 03:55 AM    MCH 30.1 03/25/2020 03:55 AM    MCHC 33.0 (L) 03/25/2020 03:55 AM    MPV 10.4 03/25/2020 03:55 AM    NEUTSPOLYS 58.10 03/23/2020 10:41 PM    LYMPHOCYTES 26.70 03/23/2020 10:41 PM    MONOCYTES 9.00 03/23/2020 10:41 PM    EOSINOPHILS 4.90 03/23/2020 10:41 PM    BASOPHILS 1.00 03/23/2020 10:41 PM      Lab Results   Component Value Date/Time    SODIUM 139 03/25/2020 03:55 AM    POTASSIUM 4.2 03/25/2020 03:55 AM    CHLORIDE 106 03/25/2020 03:55 AM    CO2 22 03/25/2020 03:55 AM    GLUCOSE 108 (H) 03/25/2020 03:55 AM    BUN 26 (H) 03/25/2020 03:55 AM    CREATININE 1.20 03/25/2020 03:55 AM      Lab Results   Component Value Date/Time    CHOLSTRLTOT 108 03/25/2020 03:55 AM    LDL 42 03/25/2020 03:55 AM    HDL 49 03/25/2020 03:55 AM    TRIGLYCERIDE 83 03/25/2020 03:55 AM       Lab Results   Component Value Date/Time    ALKPHOSPHAT 67 03/25/2020 03:55 AM    ASTSGOT 17 03/25/2020 03:55 AM    ALTSGPT 18 03/25/2020 03:55 AM    TBILIRUBIN 0.3 03/25/2020 03:55 AM        Imaging/Testing:   No new neuro imaging to review    TTE reviewed in chart.    Physical Exam:      General: 84-year-old male sitting in bed no acute distress.  Cardio: Normal S1/S2.  No peripheral edema.   Pulm: CTAX2. No respiratory distress.   Skin: Warm, dry, no rashes or lesions   Psychiatric: Appropriate affect. No active psychosis.  HEENT: Atraumatic head, normal sclera and conjunctiva, moist oral mucosa. No lid lag.  Abdomen: Soft, non tender. No masses or hepatosplenomegaly.     Neurologic:  Mental Status:  AAOx4. Able to follow commands/cross midline. Speech fluent/nondysarthric. Language functions intact. No neglect/apraxia.  Cranial Nerves:  PERRL. EOMi. Face symmetric, palate/tongue midline.  Presence of right homonymous hemianopia.  Facial sensation intact.   Motor:  Normal muscle tone and bulk. Strength is 5/5 throughout. No abnormal movements.  Reflexes: Deferred  Coordination: Finger-to-nose without ataxia  Sensation: Normal to light touch throughout  Gait/Station: Deferred    Patient seen and examined on 3/25/2020 and compared to 3/24/2020 no significant clinical change was seen.    Assessment/Plan:     Isaias Singh is a 84 y.o. male with history of macular degeneration, MI, rectal bleeding presenting to the hospital for visual loss and consulted for stroke. MRI shows evidence of a L occipital infarct w/ hemorrhagic transformation. Stroke likely 2/2 cardioembolic vs artery-artery embolization. Presence of R hemianopsia on exam. Photopsias and visual phenomenon likely 2/2 release phenomena.  Today however hallucinations appear to be more formed.  Nevertheless, they are nonthreatening and should resolve with time.  Of note, he should not drive while this hemianopsia is present.  I would recommend for the patient to have visual field testing in approximately 3 months from now (end of July) to determine if he still has a visual field deficit before resuming driving.    Plan:  1.  Neurochecks and vital signs per protocol  2.  Aim for normotension  3.  LDL 42.  At goal.  4.  Hemoglobin A1c 5.7.  At goal  5.  Patient will need cardiac rhythm monitoring as an outpatient.  6.   CTA head to be done today.  7.  Hold antiplatelets for now.  Okay to start aspirin 81 mg daily on 3/30/2020. Should the patient require anticoagulation antiplatelets should be discontinued.  8.  PT OT and speech therapy as needed  9.  Referral for [facility] placed.  10.  Neurology signing off for now.  Please call with any further questions or concerns.  11.  Plan discussed with consulting physician and patient's nurse.     Raheem Camargo M.D., Diplomat of the American Board of Psychiatry and Neurology  Diplomat of [employer] Epilepsy Subspecialty   Assistant Clinical Professor, [facility] Neurology Consultant

## 2020-03-25 NOTE — THERAPY
"Occupational Therapy Evaluation completed.   Functional Status:  Mod I ADLs and ADL transfers without AD  Plan of Care: Patient with no further skilled OT needs in the acute care setting at this time  Discharge Recommendations:  Equipment: No Equipment Needed. Post-acute therapy Currently anticipate no further skilled therapy needs once patient is discharged from the inpatient setting.    See \"Rehab Therapy-Acute\" Patient Summary Report for complete documentation.    Pt is 85yo male admitted with visual changes, MRI found subacute ischemic stroke in L occipital lobe. Pt presents to OT eval without functional deficits that indicate need for OT intervention. Pt demonstrated toileting, full body dressing, tub transfer and standing grooming without AD. Pt reports supportive spouse who can provide transportation as he plans not to drive with his new onset of R field blurry vision. No additional acute OT needs at this time.   "

## 2020-03-26 VITALS
OXYGEN SATURATION: 93 % | HEART RATE: 57 BPM | DIASTOLIC BLOOD PRESSURE: 76 MMHG | RESPIRATION RATE: 16 BRPM | WEIGHT: 150.57 LBS | TEMPERATURE: 98.5 F | SYSTOLIC BLOOD PRESSURE: 135 MMHG | BODY MASS INDEX: 24.2 KG/M2 | HEIGHT: 66 IN

## 2020-03-26 LAB
ALBUMIN SERPL BCP-MCNC: 3.9 G/DL (ref 3.2–4.9)
ALBUMIN/GLOB SERPL: 1.6 G/DL
ALP SERPL-CCNC: 58 U/L (ref 30–99)
ALT SERPL-CCNC: 23 U/L (ref 2–50)
ANION GAP SERPL CALC-SCNC: 11 MMOL/L (ref 7–16)
AST SERPL-CCNC: 19 U/L (ref 12–45)
BASOPHILS # BLD AUTO: 1 % (ref 0–1.8)
BASOPHILS # BLD: 0.08 K/UL (ref 0–0.12)
BILIRUB SERPL-MCNC: 0.4 MG/DL (ref 0.1–1.5)
BUN SERPL-MCNC: 23 MG/DL (ref 8–22)
CALCIUM SERPL-MCNC: 8.9 MG/DL (ref 8.5–10.5)
CHLORIDE SERPL-SCNC: 104 MMOL/L (ref 96–112)
CO2 SERPL-SCNC: 23 MMOL/L (ref 20–33)
CREAT SERPL-MCNC: 1.04 MG/DL (ref 0.5–1.4)
EOSINOPHIL # BLD AUTO: 0.18 K/UL (ref 0–0.51)
EOSINOPHIL NFR BLD: 2.2 % (ref 0–6.9)
ERYTHROCYTE [DISTWIDTH] IN BLOOD BY AUTOMATED COUNT: 43.3 FL (ref 35.9–50)
GLOBULIN SER CALC-MCNC: 2.4 G/DL (ref 1.9–3.5)
GLUCOSE SERPL-MCNC: 95 MG/DL (ref 65–99)
HCT VFR BLD AUTO: 47.6 % (ref 42–52)
HGB BLD-MCNC: 15.3 G/DL (ref 14–18)
IMM GRANULOCYTES # BLD AUTO: 0.02 K/UL (ref 0–0.11)
IMM GRANULOCYTES NFR BLD AUTO: 0.2 % (ref 0–0.9)
LYMPHOCYTES # BLD AUTO: 2.27 K/UL (ref 1–4.8)
LYMPHOCYTES NFR BLD: 27.7 % (ref 22–41)
MCH RBC QN AUTO: 30.1 PG (ref 27–33)
MCHC RBC AUTO-ENTMCNC: 32.1 G/DL (ref 33.7–35.3)
MCV RBC AUTO: 93.5 FL (ref 81.4–97.8)
MONOCYTES # BLD AUTO: 0.44 K/UL (ref 0–0.85)
MONOCYTES NFR BLD AUTO: 5.4 % (ref 0–13.4)
NEUTROPHILS # BLD AUTO: 5.2 K/UL (ref 1.82–7.42)
NEUTROPHILS NFR BLD: 63.5 % (ref 44–72)
NRBC # BLD AUTO: 0 K/UL
NRBC BLD-RTO: 0 /100 WBC
PLATELET # BLD AUTO: 224 K/UL (ref 164–446)
PMV BLD AUTO: 10.5 FL (ref 9–12.9)
POTASSIUM SERPL-SCNC: 4.3 MMOL/L (ref 3.6–5.5)
PROT SERPL-MCNC: 6.3 G/DL (ref 6–8.2)
RBC # BLD AUTO: 5.09 M/UL (ref 4.7–6.1)
SODIUM SERPL-SCNC: 138 MMOL/L (ref 135–145)
WBC # BLD AUTO: 8.2 K/UL (ref 4.8–10.8)

## 2020-03-26 PROCEDURE — 36415 COLL VENOUS BLD VENIPUNCTURE: CPT

## 2020-03-26 PROCEDURE — 99239 HOSP IP/OBS DSCHRG MGMT >30: CPT | Mod: GC | Performed by: INTERNAL MEDICINE

## 2020-03-26 PROCEDURE — 80053 COMPREHEN METABOLIC PANEL: CPT

## 2020-03-26 PROCEDURE — 92523 SPEECH SOUND LANG COMPREHEN: CPT

## 2020-03-26 PROCEDURE — 85025 COMPLETE CBC W/AUTO DIFF WBC: CPT

## 2020-03-26 RX ORDER — ATORVASTATIN CALCIUM 80 MG/1
80 TABLET, FILM COATED ORAL EVERY EVENING
Qty: 30 TAB | Refills: 0 | Status: SHIPPED | OUTPATIENT
Start: 2020-03-26

## 2020-03-26 RX ORDER — ATORVASTATIN CALCIUM 80 MG/1
80 TABLET, FILM COATED ORAL EVERY EVENING
Qty: 30 TAB | Refills: 2 | Status: SHIPPED | OUTPATIENT
Start: 2020-03-26 | End: 2020-03-26

## 2020-03-26 RX ORDER — ASPIRIN 81 MG/1
81 TABLET, CHEWABLE ORAL DAILY
Qty: 100 TAB | Refills: 0 | Status: SHIPPED | OUTPATIENT
Start: 2020-03-30

## 2020-03-26 ASSESSMENT — ENCOUNTER SYMPTOMS
DIZZINESS: 0
COUGH: 0
SORE THROAT: 0
FEVER: 0
CONSTIPATION: 0
NERVOUS/ANXIOUS: 0
CHILLS: 0
SHORTNESS OF BREATH: 0

## 2020-03-26 NOTE — PROGRESS NOTES
Monitor summary: SB/SR 50-63, MD 0.18, QRS 0.10, QT 0.42, with rare PVCs per strip from monitor room.

## 2020-03-26 NOTE — THERAPY
"Speech Language Therapy Evaluation completed to address speech, communication and cognition  Functional Status:  Pt seen for cognitive-linguistic evaluation and presents with functional skills across cognitive linguistic domains with exception of complaints of reduced vision in R eye; visual scanning is completed L to R with L eye only, with large and medium print. 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 Mild emotional lability noted as well.  No additional SLP needs at this time although pt is aware of SLP scope of services and exercises to improve aging brain.  Recommendations:  Ok for home discharge; defer to OT  Plan of Care: Patient with no further skilled SLP needs in the acute care setting at this time  Post-Acute Therapy: suspect HH services for OT are indicated    See \"Rehab Therapy-Acute\" Patient Summary Report for complete documentation.   "

## 2020-03-26 NOTE — CARE PLAN
Problem: Communication  Goal: The ability to communicate needs accurately and effectively will improve  Outcome: PROGRESSING AS EXPECTED  Note: Encouraged to voice feelings, discussed POC, encouraged to voice questions     Problem: Safety  Goal: Will remain free from falls  Outcome: PROGRESSING AS EXPECTED  Note: Bed in lowest position, call light within reach, clutter free environment, encouraged to call prior to ambulation

## 2020-03-26 NOTE — PROGRESS NOTES
Monitor summary: SB/SR 53-81, LA 0.18, QRS 0.08, QT 0.38 with rare PAC/PVC per strip from monitor room.

## 2020-03-26 NOTE — PROGRESS NOTES
Pt discharged home with wife, escort provided. Discussed discharge instructions, all belongings taken, home meds and Rx taken.     Reinforced inability to drive until follow up with PCP. Pt verbalizes understanding.      Pt discussed being more emotional since being in the hospital and was concerned about how long it would take to go away. Discussed voicing his feelings with his support system and if symptoms do not go away or get worse then to reach out to his PCP, either at or before his follow up appointment.

## 2020-03-26 NOTE — CARE PLAN
Problem: Communication  Goal: The ability to communicate needs accurately and effectively will improve  Outcome: PROGRESSING AS EXPECTED     Problem: Safety  Goal: Will remain free from injury  Outcome: PROGRESSING AS EXPECTED  Note: Appropriate precautions in place     Problem: Knowledge Deficit  Goal: Knowledge of disease process/condition, treatment plan, diagnostic tests, and medications will improve  Outcome: PROGRESSING AS EXPECTED  Note: Stroke education/participation performed     Problem: Safety:  Goal: Will remain free from injury  Outcome: PROGRESSING AS EXPECTED  Note: Appropriate precautions in place     Problem: Knowledge Deficit:  Goal: Knowledge of disease process/condition, treatment plan, diagnostic tests, and medications will improve  Outcome: PROGRESSING AS EXPECTED  Note: Stroke education/participation performed

## 2020-03-26 NOTE — DISCHARGE INSTRUCTIONS
Discharge Instructions    Discharged to home by car with relative. Discharged via wheelchair, hospital escort: Yes.  Special equipment needed: Not Applicable    Be sure to schedule a follow-up appointment with your primary care doctor or any specialists as instructed.     Discharge Plan:   Diet Plan: Discussed  Activity Level: Discussed  Confirmed Follow up Appointment: Patient to Call and Schedule Appointment  Confirmed Symptoms Management: Discussed  Influenza Vaccine Indication: Not indicated: Previously immunized this influenza season and > 8 years of age    I understand that a diet low in cholesterol, fat, and sodium is recommended for good health. Unless I have been given specific instructions below for another diet, I accept this instruction as my diet prescription.   Other diet: Regular    Special Instructions:     Stroke/CVA/TIA/Hemorrhagic Ischemia Discharge Instructions  You have had a stroke. Your risk factors have been identified as follows:  Age - Over 55  Gender - Men are at a higher risk than women  Artery Disease / Peripheral Vascular Disease   High Cholesterol and lipids  It is important that you reduce your risk factors to avoid another stroke in the future. Here are some general guidelines to follow:  · Eat healthy - avoid food high in fat.  · Get regular exercise.  · Maintain a healthy weight.  · Avoid smoking.  · Avoid alcohol and illegal drug use.  · Take your medications as directed.  For more information regarding risk factors, refer to pages 17-19 in your Stroke Patient Education Guide. Stroke Education Guide was given to patient.    Warning signs of a stroke include (which can also be found on page 3 of your Stroke Patient Education Guide):  · Sudden numbness of weakness of the face, arm or leg (especially on one side of the body).  · Sudden confusion, trouble speaking or understanding.  · Sudden trouble seeing in one or both eyes.  · Sudden trouble walking, dizziness, loss of balance or  coordination.  · Sudden severe headache with no known cause.  It is very important to get treatment quickly when a stroke occurs. If you experience any of the above warning signs, call [phone] immediately.     Some patients who have had a stroke will be going home on a blood thinner medication called Warfarin (Coumadin).  This medication requires very close monitoring and follow up.  This follow up can be provided by either your Primary Care Physician or by [facility] Outpatient Anticoagulation Service.  The Outpatient Anticoagulation Service is located at the [facility] at [facility] ([facility]).  If you do not know when your follow up appointment is scheduled, call [phone] to verify your appointment time.      · Is patient discharged on Warfarin / Coumadin?   No     Depression / Suicide Risk    As you are discharged from this [facility], it is important to learn how to keep safe from harming yourself.    Recognize the warning signs:  · Abrupt changes in personality, positive or negative- including increase in energy   · Giving away possessions  · Change in eating patterns- significant weight changes-  positive or negative  · Change in sleeping patterns- unable to sleep or sleeping all the time   · Unwillingness or inability to communicate  · Depression  · Unusual sadness, discouragement and loneliness  · Talk of wanting to die  · Neglect of personal appearance   · Rebelliousness- reckless behavior  · Withdrawal from people/activities they love  · Confusion- inability to concentrate     If you or a loved one observes any of these behaviors or has concerns about self-harm, here's what you can do:  · Talk about it- your feelings and reasons for harming yourself  · Remove any means that you might use to hurt yourself (examples: pills, rope, extension cords, firearm)  · Get professional help from the community (Mental Health, Substance Abuse,  psychological counseling)  · Do not be alone:Call your Safe Contact- someone whom you trust who will be there for you.  · Call your local CRISIS HOTLINE 899-0879 or 229-661-0985  · Call your local Children's Mobile Crisis Response Team [place] (841) 131-5188 or www.Ease My Sell  · Call the toll free National Suicide Prevention Hotlines   · National Suicide Prevention Lifeline 211-782-JZKO (8489)  · [place] [facility] Network 800-SUICIDE (668-7912)        Ischemic Stroke  An ischemic stroke is the sudden death of brain tissue. Blood carries oxygen to all areas of the body. This type of stroke happens when your blood does not flow to your brain like normal. Your brain cannot get the oxygen it needs. This is an emergency. It must be treated right away.  Symptoms of a stroke usually happen all of a sudden. You may notice them when you wake up. They can include:  · Weakness or loss of feeling in your face, arm, or leg. This often happens on one side of the body.  · Trouble walking.  · Trouble moving your arms or legs.  · Loss of balance or coordination.  · Feeling confused.  · Trouble talking or understanding what people are saying.  · Slurred speech.  · Trouble seeing.  · Seeing two of one object (double vision).  · Feeling dizzy.  · Feeling sick to your stomach (nauseous) and throwing up (vomiting).  · A very bad headache for no reason.  Get help as soon as any of these problems start. This is important. Some treatments work better if they are given right away. These include:  · Aspirin.  · Medicines to control blood pressure.  · A shot (injection) of medicine to break up the blood clot.  · Treatments given in the blood vessel (artery) to take out the clot or break it up.  Other treatments may include:  · Oxygen.  · Fluids given through an IV tube.  · Medicines to thin out your blood.  · Procedures to help your blood flow better.  What increases the risk?  Certain things may make you more likely to have a  stroke. Some of these are things that you can change, such as:  · Being very overweight (obesity).  · Smoking.  · Taking birth control pills.  · Not being active.  · Drinking too much alcohol.  · Using drugs.  Other risk factors include:  · High blood pressure.  · High cholesterol.  · Diabetes.  · Heart disease.  · Being , , , or .  · Being over age 60.  · Family history of stroke.  · Having had blood clots, stroke, or warning stroke (transient ischemic attack, TIA) in the past.  · Sickle cell disease.  · Being a woman with a history of high blood pressure in pregnancy (preeclampsia).  · Migraine headache.  · Sleep apnea.  · Having an irregular heartbeat (atrial fibrillation).  · Long-term (chronic) diseases that cause soreness and swelling (inflammation).  · Disorders that affect how your blood clots.  Follow these instructions at home:  Medicines  · Take over-the-counter and prescription medicines only as told by your doctor.  · If you were told to take aspirin or another medicine to thin your blood, take it exactly as told by your doctor.  ¨ Taking too much of the medicine can cause bleeding.  ¨ If you do not take enough, it may not work as well.  · Know the side effects of your medicines. If you are taking a blood thinner, make sure you:  ¨ Hold pressure over any cuts for longer than usual.  ¨ Tell your dentist and other doctors that you take this medicine.  ¨ Avoid activities that may cause damage or injury to your body.  Eating and drinking  · Follow instructions from your doctor about what you cannot eat or drink.  · Eat healthy foods.  · If you have trouble with swallowing, do these things to avoid choking:  ¨ Take small bites when eating.  ¨ Eat foods that are soft or pureed.  Safety  · Follow instructions from your health care team about physical activity.  · Use a walker or cane as told by your doctor.  · Keep your home safe so you do not fall. This may  include:  ¨ Having experts look at your home to make sure it is safe.  ¨ Putting grab bars in the bedroom and bathroom.  ¨ Using raised toilets.  ¨ Putting a seat in the shower.  General instructions  · Do not use any tobacco products.  ¨ Examples of these are cigarettes, chewing tobacco, and e-cigarettes.  ¨ If you need help quitting, ask your doctor.  · Limit how much alcohol you drink. This means no more than 1 drink a day for nonpregnant women and 2 drinks a day for men. One drink equals 12 oz of beer, 5 oz of wine, or 1½ oz of hard liquor.  · If you need help to stop using drugs or alcohol, ask your doctor to refer you to a program or specialist.  · Stay active. Exercise as told by your doctor.  · Keep all follow-up visits as told by your doctor. This is important.  Get help right away if:  · You suddenly:  ¨ Have weakness or loss of feeling in your face, arm, or leg.  ¨ Feel confused.  ¨ Have trouble talking or understanding what people are saying.  ¨ Have trouble seeing.  ¨ Have trouble walking.  ¨ Have trouble moving your arms or legs.  ¨ Feel dizzy.  ¨ Lose your balance or coordination.  ¨ Have a very bad headache and you do not know why.  · You pass out (lose consciousness) or almost pass out.  · You have jerky movements that you cannot control (seizure).  These symptoms may be an emergency. Do not wait to see if the symptoms will go away. Get medical help right away. Call your local emergency services (911 in the [place] Do not drive yourself to the hospital.   This information is not intended to replace advice given to you by your health care provider. Make sure you discuss any questions you have with your health care provider.  Document Released: 12/06/2012 Document Revised: 05/30/2017 Document Reviewed: 03/15/2017  Elsevier Interactive Patient Education © 2017 Elsevier Inc.          Age-Related Macular Degeneration  Introduction  Age-related macular degeneration (AMD) is an eye disease related to aging.  The disease causes you to lose your central vision, which is the part of your vision that allows you to see objects clearly and do daily tasks like reading and driving.  There are two main types of AMD:  · Dry AMD. People with this type lose their vision slowly. This is the most common type of AMD. Some people with dry AMD notice very little change in their vision as they age.  · Wet AMD. People with this type lose their vision quickly.  What are the causes?  This condition is caused by damage to the part of the eye that provides you with central vision (macula). Dry AMD happens when deposits in the macula cause light-sensitive cells to slowly break down. Wet AMD happens when abnormal blood vessels grow under the macula and leak blood and fluid.  What increases the risk?  The following factors may make you more likely to develop this condition:  · Being 50 years old or older.  · Smoking.  · Being obese.  · Having a family history of AMD.  · Having high cholesterol, high blood pressure, or heart disease.  · Having been exposed to high levels of ultraviolet (UV) light and blue light.  · Being white ().  · Being female.  What are the signs or symptoms?  Symptoms of this condition include:  · Blurred vision, especially when reading print material. The blurred vision often goes away in brighter light.  · A blurred or blind spot in the center of your field of vision that is small but growing larger.  · Bright colors seeming less bright than they used to be.  · Decreased ability to recognize faces.  · One eye seeming worse than the other.  · Decreased ability to adapt to dimly lit rooms.  · Straight lines appearing crooked or wavy.  How is this diagnosed?  This condition is diagnosed based on your symptoms and an eye exam. During the eye exam, eye drops will be placed in your eyes to enlarge (dilate) your pupils. This will allow your health care provider to see the back of your eye better. You may be asked to look  at an image that looks like a checkerboard (Amsler grid). Early changes in your central vision may cause the grid to look distorted.  After the exam, you may be given a test called a fluorescein angiogram. This test determines whether you have dry or wet AMD. During this test, dye is injected into a vein and pictures are taken as the dye travels through your eye.  How is this treated?  There is no cure for this condition, but treatment can help slow down how fast it gets worse. Treatment may include:  · Supplements, including vitamin C, vitamin E, beta carotene, and zinc.  · Laser surgery to destroy new blood vessels or leaking blood vessels in your eye.  · Injections of medicines into your eye to slow down the formation of abnormal blood vessels that might leak. These injections are given by a health care provider.  Follow these instructions at home:  · Get an eye exam as often told by your health care provider. Make sure to get an eye exam at least once every year.  · Take vitamins and supplements as told by your health care provider.  · Take over-the-counter and prescription medicines only as told by your health care provider.  · Ask your health care provider for an Amsler grid, and use it every day to check each eye for vision changes.  · Keep all follow-up visits as told by your health care provider. This is important.  Contact a health care provider if:  · You notice any new changes in your vision.  This information is not intended to replace advice given to you by your health care provider. Make sure you discuss any questions you have with your health care provider.  Document Released: 03/26/2009 Document Revised: 05/25/2017 Document Reviewed: 10/13/2016  © 2017 Elsevier

## 2020-03-26 NOTE — DISCHARGE SUMMARY
Internal Medicine Discharge Summary  Note Author: Lesly Gage M.D.       Name Isaias Singh     1935   Age/Sex 84 y.o. male   MRN [id number]       Admit Date:  3/23/2020       Discharge Date:   3/25/2020    Service:   R Internal Medicine White Team  Attending Physician(s):          Senior Resident(s):   Too   Rowdy Resident(s):   [place]   PCP: Angel Yoon M.D.    Primary Diagnosis:   Acute ischemic cerebrovascular accident (CVA) involving posterior cerebral artery territory (HCC)    Secondary Diagnoses:                Principal Problem:    Acute ischemic cerebrovascular accident (CVA) involving posterior cerebral artery territory (HCC) POA: Yes  Active Problems:    Coronary artery disease POA: Unknown    Hyperlipidemia POA: Unknown    Acute kidney injury likely due to dehydration  POA: No  Resolved Problems:    * No resolved hospital problems. *    Hospital Summary (Brief Narrative):         Isaias is a 84 y.o. male with the past medical history of coronary artery disease s/p stents x2, BPH, HLD, diverticulitis, macular degeneration of left eye, hearing loss, who was brought in by care flight ground as transfer from [facility] on 3/23/2020 with symptoms of trouble vision on right side for 2 days.  CT head without contrast from outside hospital showed 1.7 cm mass on left occipital lobe. CTA head during this admission   Showed Subacute left occipital infarct with small associated petechial hemorrhage. Mild Atrophy and chronic small vessel ischemic changes.  Neurology was consulted and recommended MRI brain showed Acute / subacute hemorrhagic left occipital lobe infarct. Mild chronic microvascular ischemic type changes and multiple chronic small lacunar infarcts. No acute intervention was recommended. Discussed with patient regarding the Home health as recommended by OT, patient would like to defer this at this time. PT and SLP recommended no acute needs post  discharge. He will start aspirin on 3/30/2020. He will not drive for three months as recommended by neurology and advised to follow up to re assess. Document has been faxed to the [facility].   He will follow up with PCP outpatient.     Consultants:     Neurology    Procedures:        None    Imaging/ Testing:      CT-CTA HEAD WITH & W/O-POST PROCESS   Final Result      1.  No large vessel occlusion, high-grade stenosis or aneurysm of the [place].   2.  Subacute left occipital infarct with small associated petechial hemorrhage. No significant interval change since recent prior study.   3.  Mild atrophy and chronic small vessel ischemic changes.         EC-ECHOCARDIOGRAM COMPLETE W/O CONT   Final Result      US-CAROTID DOPPLER BILAT   Final Result      MR-BRAIN-WITH & W/O   Final Result      1.  Acute/subacute hemorrhagic left occipital lobe infarct.   2.  Mild chronic microvascular ischemic type changes and multiple chronic small lacunar infarcts.   3.  Age-related generalized volume loss.   4.  No significant mass or pathologic enhancement.      CT-FOREIGN FILM CAT SCAN   Final Result      OUTSIDE IMAGES-CT HEAD   Final Result        Discharge Medications:         Medication Reconciliation: Completed       Medication List      CHANGE how you take these medications      Instructions   aspirin 81 MG Chew chewable tablet  Start taking on:  March 30, 2020  What changed:  These instructions start on March 30, 2020. If you are unsure what to do until then, ask your doctor or other care provider.  Commonly known as:  ASA   Take 1 Tab by mouth every day.  Dose:  81 mg     atorvastatin 80 MG tablet  What changed:    · medication strength  · how much to take  · when to take this  Commonly known as:  LIPITOR   Take 1 Tab by mouth every evening.  Dose:  80 mg        CONTINUE taking these medications      Instructions   glucosamine Sulfate 500 MG Caps   Take 500 mg by mouth every day.  Dose:  500 mg            Can use  .DISCHARGEMEDSLIST if going to another facility         Disposition:   Home    Diet:   As tolerated     Activity:   As tolerated    Instructions:         The patient was instructed to return to the ER in the event of worsening symptoms. I have counseled the patient on the importance of compliance and the patient has agreed to proceed with all medical recommendations and follow up plan indicated above.   The patient understands that all medications come with benefits and risks. Risks may include permanent injury or death and these risks can be minimized with close reassessment and monitoring.        Primary Care Provider:    Angel Yoon M.D.  Discharge summary faxed to primary care provider:  Completed  Copy of discharge summary given to the patient: Completed      Follow up appointment details :        Angel Yoon M.D.  [address] [place] [address]  [phone]    Schedule an appointment as soon as possible for a visit in 1 week  for hospital follow up discharge     Pending Studies:        None     Time spent on discharge day patient visit, preparing discharge paperwork and arranging for patient follow up.    Discharge Time (Minutes) :    55 minutes   Hospital Course Type:  Inpatient Stay >2 midnights    Condition on Discharge    ______________________________________________________________________    Interval history/exam for day of discharge:    - No acute complaints this am  - VSS stable overnight  - Labs reviewed: unremarkable    Review of Systems   Constitutional: Negative for chills and fever.   HENT: Negative for congestion and sore throat.    Respiratory: Negative for cough and shortness of breath.    Cardiovascular: Negative for chest pain.   Gastrointestinal: Negative for constipation.   Endocrine: Negative for cold intolerance.   Genitourinary: Negative for frequency and urgency.   Neurological: Negative for dizziness.   Psychiatric/Behavioral: The patient is not nervous/anxious.      Physical  Exam   Constitutional: He is oriented to person, place, and time. No distress.   HENT:   Mouth/Throat: Oropharynx is clear and moist. No oropharyngeal exudate.   Eyes: Pupils are equal, round, and reactive to light. Conjunctivae and EOM are normal.   Neck: Normal range of motion. Neck supple. No JVD present.   Cardiovascular: Normal rate and regular rhythm.   Pulmonary/Chest: Effort normal and breath sounds normal.   Abdominal: Soft. Bowel sounds are normal. He exhibits no distension. There is no abdominal tenderness.   Musculoskeletal: Normal range of motion.         General: No edema.   Neurological: He is alert and oriented to person, place, and time. No cranial nerve deficit.   Skin: Skin is warm and dry. He is not diaphoretic.   Psychiatric: Affect normal.   Nursing note and vitals reviewed.    Most Recent Labs:    Lab Results   Component Value Date/Time    WBC 8.2 03/26/2020 03:36 AM    RBC 5.09 03/26/2020 03:36 AM    HEMOGLOBIN 15.3 03/26/2020 03:36 AM    HEMATOCRIT 47.6 03/26/2020 03:36 AM    MCV 93.5 03/26/2020 03:36 AM    MCH 30.1 03/26/2020 03:36 AM    MCHC 32.1 (L) 03/26/2020 03:36 AM    MPV 10.5 03/26/2020 03:36 AM    NEUTSPOLYS 63.50 03/26/2020 03:36 AM    LYMPHOCYTES 27.70 03/26/2020 03:36 AM    MONOCYTES 5.40 03/26/2020 03:36 AM    EOSINOPHILS 2.20 03/26/2020 03:36 AM    BASOPHILS 1.00 03/26/2020 03:36 AM      Lab Results   Component Value Date/Time    SODIUM 138 03/26/2020 03:36 AM    POTASSIUM 4.3 03/26/2020 03:36 AM    CHLORIDE 104 03/26/2020 03:36 AM    CO2 23 03/26/2020 03:36 AM    GLUCOSE 95 03/26/2020 03:36 AM    BUN 23 (H) 03/26/2020 03:36 AM    CREATININE 1.04 03/26/2020 03:36 AM      Lab Results   Component Value Date/Time    ALTSGPT 23 03/26/2020 03:36 AM    ASTSGOT 19 03/26/2020 03:36 AM    ALKPHOSPHAT 58 03/26/2020 03:36 AM    TBILIRUBIN 0.4 03/26/2020 03:36 AM    ALBUMIN 3.9 03/26/2020 03:36 AM    GLOBULIN 2.4 03/26/2020 03:36 AM    INR 1.15 (H) 03/23/2020 10:41 PM     Lab Results      Component Value Date/Time    PROTHROMBTM 15.0 (H) 03/23/2020 10:41 PM    INR 1.15 (H) 03/23/2020 10:41 PM

## 2020-05-12 NOTE — PROGRESS NOTES
Chief Complaint   Patient presents with   • New Patient     stroke       Problem List Items Addressed This Visit     None      Visit Diagnoses     Ischemic stroke (HCC)          THIS CONSULTATION WAS PERFORMED VIA TELEMEDICINE, UTILIZING AN ENCRYPTED TRANSMISSION, TO PREVENT SPREAD OF COVID19. THE PATIENT CONSENTED TO THIS CONSULTATION.    History of present illness:  Isaias Singh 84 y.o. male presents today for stroke bridge clinic. His wife, Paulette, is joining us today. PMHx: CAD, BPH, HLD, macular degeneration in L eye    Pt was seen in the hospital on 3/24/2020 for c/o right vision loss and headache. Brain scan showed left occipital infarct with hemorrhagic transformation. He was already taking baby asa prior to this and was on low dose statin, as well. When he got discharge from the hospital his statin was increased to 80mg daily. He continues to c/o of cloudiness and visual disturbances in the right. He is seeing ophthalmology and wife says he also has cataracts bilaterally. He is also getting injections for his macular degeneration in the left eye but his eye doctor is concerned of the bleeding side effects of the drug called avastin. They want to know my opinion, if he should continue getting the injection given the side effect. They state that his cardiologist had recommended to stop taking asa 2 weeks prior to his injection. Pt reports the he can go blind without the injection.    He does drink alcohol. Not smoking cigarettes.not using recreational drugs.     He has a long term cardiologist and wife had mentioned heart monitoring to him as pt was supposed to leave the hospital with a monitor but that did not happen. His cardiology agreed to this and they state that they will do this on his next f/u.    He is checking his BP all the time and BP has been <120/80. He is not taking BP med.     Father had a stroke.     He is not driving.     Past medical history:   Past Medical History:   Diagnosis  Date   • Acute ischemic cerebrovascular accident (CVA) involving posterior cerebral artery territory (HCC)    • Acute kidney injury likely due to dehydration  3/25/2020   • Cancer (HCC)     melanoma of L cheek    • Cataract    • Cold    • Macular degeneration disease    • Myocardial infarct (HCC)     2012   • Unspecified urinary incontinence    • Urinary bladder disorder        Past surgical history:   Past Surgical History:   Procedure Laterality Date   • TRANS URETHRAL RESECTION PROSTATE  4/12/2013    Performed by Omar [facility] M.D. at [facility]   • OTHER CARDIAC SURGERY      stents in heart       Family history:   History reviewed. No pertinent family history.    Social history:   Social History     Socioeconomic History   • Marital status:      Spouse name: Not on file   • Number of children: Not on file   • Years of education: Not on file   • Highest education level: Not on file   Occupational History   • Not on file   Social Needs   • Financial resource strain: Not on file   • Food insecurity     Worry: Not on file     Inability: Not on file   • Transportation needs     Medical: Not on file     Non-medical: Not on file   Tobacco Use   • Smoking status: Never Smoker   • Smokeless tobacco: Never Used   Substance and Sexual Activity   • Alcohol use: Yes     Frequency: Monthly or less   • Drug use: No   • Sexual activity: Not on file   Lifestyle   • Physical activity     Days per week: Not on file     Minutes per session: Not on file   • Stress: Not on file   Relationships   • Social connections     Talks on phone: Not on file     Gets together: Not on file     Attends Hindu service: Not on file     Active member of club or organization: Not on file     Attends meetings of clubs or organizations: Not on file     Relationship status: Not on file   • Intimate partner violence     Fear of current or ex partner: Not on file     Emotionally abused: Not on file     Physically abused: Not on  file     Forced sexual activity: Not on file   Other Topics Concern   • Primary/coprimary nurse & associates Not Asked   • Family contact information Not Asked   • OK to release patient information to the following Not Asked   • Patient preferred routine/Privacy concerns Not Asked   • Patient likes and dislikes Not Asked   • Participating In Research Study Not Asked   • Miscellaneous Not Asked   Social History Narrative   • Not on file       Current medications:   Current Outpatient Medications   Medication   • atorvastatin (LIPITOR) 80 MG tablet   • aspirin (ASA) 81 MG Chew Tab chewable tablet   • glucosamine Sulfate 500 MG Cap     No current facility-administered medications for this visit.        Medication Allergy:  Allergies   Allergen Reactions   • Penicillins Rash       eview of systems:     General: Denies fevers or chills, or nightsweats, or generalized fatigue.    Head: Denies headaches or dizziness or lightheadedness  EENT: Denies nasal secretion, nasal bleeding, difficulty swallowing, hearing loss, tinnitus, vertigo, ear pain. + vision changes bilaterally  Respiratory: Denies shortness of breath, cough, sputum, or wheezing  Cardiac: Denies chest pain, palpitations, edema or syncope  Gastrointestinal: Denies nausea, vomiting, no abdominal pain or change in bowel habits, no melena or hematochezia  Urinary: Denies dysuria, frequency, hesitancy, or incontinence.  Dermatologic:  Denies new rash  Musculoskeletal: Denies muscle pain or swelling, no atrophy, no neck and back pain or stiffness.   Neurologic: Denies facial droopiness, muscle weakness (focal or generalized), paresthesias, ataxia, change in speech or language, memory loss, abnormal movements, seizures, loss of consciousness, or episodes of confusion.   Psychiatric: Denies anxiety, depression, mood swings, suicidal or homicidal thoughts       Physical examination:   General: Patient in no acute distress, pleasant and  cooperative.  HEENT: Normocephalic, no signs of acute trauma.   Neck: There is normal range of motion.   Skin: no signs of acute rashes or trauma in visible areas  Psychiatric: No hallucinatory behavior. No symptoms of depression or suicidal ideation. Mood and affect appear normal on exam.      NEUROLOGICAL EXAM:   Mental status, orientation: Awake, alert and fully oriented.   Speech and language: speech is clear and fluent. The patient is able to name, repeat and comprehend.   Memory: There is intact recollection of recent and remote events.   Cranial nerve exam:   CN I: Not examined   CN II: Unable to assess  CN III, IV, VI: EOMI  CN V: Unable to assess  CN VII: face symmetric   CN VIII: Unable to assess  CN IX, X: Unable to assess  CN XI: Symmetric shoulder shrug  CN XII: tongue midline.   Motor exam: Appears to have equal strength in all extremities. No abnormal movements were seen on exam.   Sensory exam Unable to assess  Deep tendon reflexes: Unable to assess  Coordination: movements were fluid, no dysmetria  Gait: The patient was able to get up from seated position on first attempt without requiring assistance. Found to be steady when walking. Movements were fluid with normal arm swing. The patient was able to turn without difficulties or tendency to fall. Romberg exam swaying        ANCILLARY DATA REVIEWED:     Lab Data Review:  Reviewed in chart. CBC, CMP, lipid, HgA1c    Records reviewed:  Reviewed in chart.    Imaging:  MRI brain 3/24/2020  1.  Acute/subacute hemorrhagic left occipital lobe infarct.  2.  Mild chronic microvascular ischemic type changes and multiple chronic small lacunar infarcts.  3.  Age-related generalized volume loss.  4.  No significant mass or pathologic enhancement.    US carotid duplex bilateral 3/24/2020   Vascular Laboratory   CONCLUSIONS   Mild bilateral internal carotid artery stenosis (<50%).     Echo 3/24/2020  CONCLUSIONS  No prior study is available for comparison.   Normal  left ventricular systolic function.  Left ventricular ejection fraction is visually estimated to be 60%.  No significant valve abnormalities.   Right ventricular systolic pressure is estimated to be 25 mmHg.  A definite cardiac source for a systemic thromboembolic event is not   identified, failure to do so does not exclude its presence. If   clinically indicated, a transesophageal study would be useful.     CTA head 3/24/2020  1.  No large vessel occlusion, high-grade stenosis or aneurysm of the [place].  2.  Subacute left occipital infarct with small associated petechial hemorrhage. No significant interval change since recent prior study.  3.  Mild atrophy and chronic small vessel ischemic changes.      ASSESSMENT AND PLAN:    1. Ischemic stroke (HCC)    2. Visual disturbance due to recent cerebrovascular accident    3. Altered glucose metabolism    4. Hospital discharge follow-up    5. Encounter for examination for driving license      CLINICAL DECISION:  Pt suffered from L occipital infarction in March 2020. Etiology is artery to artery embolism vs cardioembolic. CTA head was unremarkable. Carotid US showed no significant stenosis. He continues to have visual disturbances in the right eye and he also has macular degeneration in the left eye. He is getting injection for the left eye to prevent him from totally losing his vision. There was a concern regarding the side effects of this medication and cardiology had recommended to stop taking asa 2 weeks prior to injection. They are aware of the risk of having another stroke if he stops taking the asa for that long. They want to get another opinion from neuro ophthalmology. They are now aware that he also had lacunar infarcts in the past that may be related to his vascular risk factors including HLD and altered glucose metabolism.       Plan:  -Continue current medications. Regimen will change if pt has afib. Will defer this to cardiology. Aware of side  effects.     -Education given to help control risk factors including:  /90, LDL 70 or less, HBA1C 7 or less. Recent A1c was 5.7.     -Needs close fu with PCP for optimal control of risk factors.     -Diet, exercise and weight loss. Limiting intake of sugar and carbohydrates.     -continue to f/u cardiology for heart monitoring for PAF. Recommended a longer monitoring if 30 day result is normal.     -referral given to neuro ophthalmology. Continue f/u with ophthalmology. Recommended not to drive. Will defer driving clearance to them.         FOLLOW-UP:   Return no further f/u.        EDUCATION AND COUNSELING:  -Usefulness of anti-platelets in secondary stroke prevention was discussed. The side effects, including increased risk for bleeding (both traumatic and spontaneous) were reviewed with the patient at length.   -Recommended goal for LDL is 70 or less. Side effects of statin, including idiosyncratic reactions as well as more common myalgias, and liver injury  were discussed. Monitoring of LFT’s will be deferred to the patient’s primary care physician.   -Secondary stroke prevention education was provided, including; lifestyle modification with healthy diet, and exercise, as well as recommendations for optimal control of risk factors.   -Close follow up with PCP is recommended.   -Compliance with medications was discussed in detail.   -Smoking cessation education was provided for greater than 3 minutes.   -The patient/family was educated on recognition of stroke symptoms. The patient/family instructed to call 911 EMERGENTLY upon presentation of any of these symptoms/signs, to be taken to nearest emergency department for evaluation and acute care.           Christie Cohen, MSN, APRN, FNP-C  [facility]  Office: [phone]  Fax: [phone]      This encounter was conducted via Zoom .   Verbal consent was obtained. Patient's identity was verified.

## 2020-05-14 ENCOUNTER — TELEMEDICINE (OUTPATIENT)
Dept: NEUROLOGY | Facility: MEDICAL CENTER | Age: 85
End: 2020-05-14
Payer: MEDICARE

## 2020-05-14 VITALS
TEMPERATURE: 98 F | BODY MASS INDEX: 23.95 KG/M2 | WEIGHT: 149 LBS | DIASTOLIC BLOOD PRESSURE: 66 MMHG | SYSTOLIC BLOOD PRESSURE: 111 MMHG | HEIGHT: 66 IN

## 2020-05-14 DIAGNOSIS — R73.09 ALTERED GLUCOSE METABOLISM: ICD-10-CM

## 2020-05-14 DIAGNOSIS — H53.9 VISUAL DISTURBANCE DUE TO RECENT CEREBROVASCULAR ACCIDENT: ICD-10-CM

## 2020-05-14 DIAGNOSIS — I63.9 ISCHEMIC STROKE (HCC): ICD-10-CM

## 2020-05-14 DIAGNOSIS — I69.398 VISUAL DISTURBANCE DUE TO RECENT CEREBROVASCULAR ACCIDENT: ICD-10-CM

## 2020-05-14 DIAGNOSIS — Z02.4 ENCOUNTER FOR EXAMINATION FOR DRIVING LICENSE: ICD-10-CM

## 2020-05-14 DIAGNOSIS — Z09 HOSPITAL DISCHARGE FOLLOW-UP: ICD-10-CM

## 2020-05-14 PROCEDURE — 99215 OFFICE O/P EST HI 40 MIN: CPT | Mod: 95,CR | Performed by: NURSE PRACTITIONER

## 2020-05-14 RX ORDER — M-VIT,TX,IRON,MINS/CALC/FOLIC 27MG-0.4MG
1 TABLET ORAL DAILY
COMMUNITY

## 2020-05-14 ASSESSMENT — FIBROSIS 4 INDEX: FIB4 SCORE: 1.45
